# Patient Record
Sex: FEMALE | Race: BLACK OR AFRICAN AMERICAN | NOT HISPANIC OR LATINO | Employment: FULL TIME | URBAN - METROPOLITAN AREA
[De-identification: names, ages, dates, MRNs, and addresses within clinical notes are randomized per-mention and may not be internally consistent; named-entity substitution may affect disease eponyms.]

---

## 2018-01-26 PROCEDURE — 87624 HPV HI-RISK TYP POOLED RSLT: CPT | Performed by: FAMILY MEDICINE

## 2018-01-26 PROCEDURE — G0145 SCR C/V CYTO,THINLAYER,RESCR: HCPCS | Performed by: FAMILY MEDICINE

## 2018-01-29 ENCOUNTER — LAB REQUISITION (OUTPATIENT)
Dept: LAB | Facility: HOSPITAL | Age: 58
End: 2018-01-29
Payer: COMMERCIAL

## 2018-01-29 DIAGNOSIS — Z11.51 ENCOUNTER FOR SCREENING FOR HUMAN PAPILLOMAVIRUS (HPV): ICD-10-CM

## 2018-02-02 LAB
HPV RRNA GENITAL QL NAA+PROBE: NORMAL
LAB AP GYN PRIMARY INTERPRETATION: NORMAL
Lab: NORMAL

## 2018-05-11 ENCOUNTER — TRANSCRIBE ORDERS (OUTPATIENT)
Dept: ADMINISTRATIVE | Facility: HOSPITAL | Age: 58
End: 2018-05-11

## 2018-05-11 DIAGNOSIS — E05.80 HYPERTHYROIDISM DUE TO MOLAR THYROTROPIN: Primary | ICD-10-CM

## 2018-05-11 DIAGNOSIS — Z87.59 HYPERTHYROIDISM DUE TO MOLAR THYROTROPIN: Primary | ICD-10-CM

## 2018-05-18 LAB
ABSOL LYMPHOCYTES (HISTORICAL): 2.2 K/UL (ref 0.5–4)
ALBUMIN SERPL BCP-MCNC: 4.3 G/DL (ref 3–5.2)
ALP SERPL-CCNC: 96 U/L (ref 43–122)
ALT SERPL W P-5'-P-CCNC: 36 U/L (ref 9–52)
AST SERPL W P-5'-P-CCNC: 36 U/L (ref 14–36)
BILIRUB SERPL-MCNC: 0.8 MG/DL
BILIRUBIN DIRECT (HISTORICAL): 0.1 MG/DL
COMMENT (HISTORICAL): ABNORMAL
DEPRECATED RDW RBC AUTO: 13.8 %
EOSINOPHIL # BLD AUTO: 0.1 K/UL (ref 0–0.4)
EOSINOPHIL NFR BLD AUTO: 1 % (ref 0–6)
HCT VFR BLD AUTO: 41.3 % (ref 36–46)
HGB BLD-MCNC: 13.3 G/DL (ref 12–16)
LYMPHOCYTES NFR BLD AUTO: 40 % (ref 25–45)
MCH RBC QN AUTO: 25.4 PG (ref 26–34)
MCHC RBC AUTO-ENTMCNC: 32.3 % (ref 31–36)
MCV RBC AUTO: 79 FL (ref 80–100)
MONOCYTES # BLD AUTO: 0.5 K/UL (ref 0.2–0.9)
MONOCYTES NFR BLD AUTO: 10 % (ref 1–10)
NEUTROPHILS ABS COUNT (HISTORICAL): 2.6 K/UL (ref 1.8–7.8)
NEUTS SEG NFR BLD AUTO: 49 % (ref 45–65)
PLATELET # BLD AUTO: 219 K/MCL (ref 150–450)
RBC # BLD AUTO: 5.25 M/MCL (ref 4–5.2)
RBC MORPHOLOGY (HISTORICAL): ABNORMAL
T3 SERPL-MCNC: 3.04 NG/ML (ref 0.97–1.69)
T4 FREE SERPL-MCNC: 2.54 NG/DL (ref 0.78–2.19)
TOTAL PROTEIN (HISTORICAL): 7.4 G/DL (ref 5.9–8.4)
TSH SERPL DL<=0.05 MIU/L-ACNC: <0.015 UIU/ML (ref 0.47–4.68)
WBC # BLD AUTO: 5.4 K/MCL (ref 4.5–11)

## 2018-05-23 LAB — THYROID STIMULATING IMMUNOGLOBULIN (HISTORICAL): 135

## 2018-06-14 LAB
T3 SERPL-MCNC: 2.25 NG/ML (ref 0.97–1.69)
T4 FREE SERPL-MCNC: 1.23 NG/DL (ref 0.78–2.19)
TSH SERPL DL<=0.05 MIU/L-ACNC: <0.015 UIU/ML (ref 0.47–4.68)

## 2018-06-29 ENCOUNTER — TRANSCRIBE ORDERS (OUTPATIENT)
Dept: ADMINISTRATIVE | Facility: HOSPITAL | Age: 58
End: 2018-06-29

## 2018-06-29 ENCOUNTER — OFFICE VISIT (OUTPATIENT)
Dept: ENDOCRINOLOGY | Facility: CLINIC | Age: 58
End: 2018-06-29
Payer: COMMERCIAL

## 2018-06-29 ENCOUNTER — APPOINTMENT (OUTPATIENT)
Dept: LAB | Facility: HOSPITAL | Age: 58
End: 2018-06-29
Payer: COMMERCIAL

## 2018-06-29 VITALS
TEMPERATURE: 98.2 F | HEART RATE: 100 BPM | DIASTOLIC BLOOD PRESSURE: 70 MMHG | WEIGHT: 134 LBS | SYSTOLIC BLOOD PRESSURE: 132 MMHG | RESPIRATION RATE: 16 BRPM

## 2018-06-29 DIAGNOSIS — E05.00 GRAVES DISEASE: ICD-10-CM

## 2018-06-29 DIAGNOSIS — E05.00 GRAVES DISEASE: Primary | ICD-10-CM

## 2018-06-29 LAB
T3 SERPL-MCNC: 1.9 NG/ML (ref 0.6–1.8)
T4 FREE SERPL-MCNC: 1.16 NG/DL (ref 0.76–1.46)
TSH SERPL DL<=0.05 MIU/L-ACNC: <0.015 UIU/ML (ref 0.47–4.68)

## 2018-06-29 PROCEDURE — 84439 ASSAY OF FREE THYROXINE: CPT

## 2018-06-29 PROCEDURE — 84443 ASSAY THYROID STIM HORMONE: CPT

## 2018-06-29 PROCEDURE — 99213 OFFICE O/P EST LOW 20 MIN: CPT | Performed by: INTERNAL MEDICINE

## 2018-06-29 PROCEDURE — 84480 ASSAY TRIIODOTHYRONINE (T3): CPT

## 2018-06-29 PROCEDURE — 36415 COLL VENOUS BLD VENIPUNCTURE: CPT

## 2018-06-29 RX ORDER — METHIMAZOLE 5 MG/1
TABLET ORAL
COMMUNITY
Start: 2018-06-01 | End: 2018-06-29 | Stop reason: SDUPTHER

## 2018-06-29 RX ORDER — OLOPATADINE HYDROCHLORIDE 1 MG/ML
1 SOLUTION/ DROPS OPHTHALMIC
COMMUNITY
Start: 2018-04-20 | End: 2021-10-01 | Stop reason: SDUPTHER

## 2018-06-29 RX ORDER — KETOTIFEN FUMARATE 0.35 MG/ML
SOLUTION/ DROPS OPHTHALMIC
COMMUNITY
Start: 2018-06-11

## 2018-06-29 RX ORDER — POLYVINYL ALCOHOL 14 MG/ML
SOLUTION/ DROPS OPHTHALMIC
COMMUNITY
Start: 2018-05-14

## 2018-06-29 RX ORDER — 1.1% SODIUM FLUORIDE 11 MG/G
GEL DENTAL
COMMUNITY
Start: 2018-05-25 | End: 2020-09-18

## 2018-06-29 RX ORDER — METHIMAZOLE 5 MG/1
5 TABLET ORAL DAILY
Qty: 30 TABLET | Refills: 1 | Status: SHIPPED | OUTPATIENT
Start: 2018-06-29 | End: 2018-11-16 | Stop reason: SDUPTHER

## 2018-06-29 NOTE — PROGRESS NOTES
Geovanny Mota is a 65 y o , female  Presenting for follow up of hyperthyroidism  HYPERTHYROIDISM:   Geovanny Mota first presented for consultation in 5 2018 for thyrotoxicosis discovered on routine labs  She had high TSI levels  Baseline LFTs and ANC were normal  I prescribed MMI 5mg daily  Today she states she feels well  Patient denies symptoms of hyperthyroidism, including recent unintentional weight loss, fatigue, heat intolerance, hyperdefecation, tremor, or palpitations  There is no history of goiter  Patient denies difficulty swallowing, SOB, or hoarseness  Denies eye swelling/bulging/redness/dry eyes/double vision   Denies recent URI/neck pain   Not taking any of the following medications   Biotin  Amiodarone   Lithium   Interferon   Cytokine inhibitor   Immune checkpoint inhibitor   Denies recent exposure to iodinated contrast dye   MVI with iodine   Patient has no known h/o thyroid disease in the past  No family h/o thyroid disease  No h/o neck irradiation or surgery  No family h/o thyroid cancer  Not taking any other medications that could impact thyroid test results or thyroid function  She is postmenopausal     No known cardiac disease/ liver disease or renal disease     METABOLIC BONE HEALTH:     Menopause was at age 49 years     Exercise: walking daily- 30 mins average  No recent falls   No fractures   Family history of osteoporosis: no   Parental history of hip fracture: no  No kidney stones       Allergies: No active allergies       Current Outpatient Prescriptions:     olopatadine (PATANOL) 0 1 % ophthalmic solution, Apply 1 drop to eye, Disp: , Rfl:     ARTIFICIAL TEARS 1 4 % ophthalmic solution, , Disp: , Rfl:     ketotifen (ZADITOR) 0 025 % ophthalmic solution, , Disp: , Rfl:     methimazole (TAPAZOLE) 5 mg tablet, Take 1 tablet (5 mg total) by mouth daily, Disp: 30 tablet, Rfl: 1    SF 1 1 % GEL, , Disp: , Rfl:      Active Ambulatory Problems     Diagnosis Date Noted    Graves disease 06/29/2018     Resolved Ambulatory Problems     Diagnosis Date Noted    No Resolved Ambulatory Problems     No Additional Past Medical History         Social history     reports that she has never smoked  She has never used smokeless tobacco  She reports that she does not drink alcohol or use drugs  family history is not on file  No family history on file  There is no additional family hx of hyperthyroidism, hypothyroidism or goiter  Additionally, there is no family hx of autoimmune disease       Review of systems  Constitutional- denies wt loss/gain, fatigue, fever  Eyes:denies redness/swelling/itching/visual loss/diplopia  Ears:denies ear pain/hearing difficulty  Neck: denies neck swelling/pain/stridor  RS: denies wheezing/chest pain/cough/difficulty breathing  CVS: denies palpitations/chest pain/leg swelling/orthopnea/syncope  GI: denies abdominal pain/changes in bowel movements/changes in appetite/refux/nausea  : denies changes in urine color/flow/nocturia  Musculoskeletal: denies difficulty walking/ leg pain/back pain/lfocal weakness  Neuro: denies numbness/tingling/dysesthesias/tremors  Skin/hair: denies rash/dry skin/hair loss        LABS  Component      Latest Ref Rng & Units 5/18/2018 6/14/2018   Free T4      0 78 - 2 19 ng/dL 2 54 (H) 1 23   T3, Total      0 97 - 1 69 ng/mL 3 04 (H) 2 25 (H)   TSH 3RD GENERATON      0 465 - 4 680 uIU/mL <0 015 (L) <0 015 (L)   THYROID STIMULATING IMMUNOGLOBULIN       135 (H)      I have reviewed prior  labs which reveal the following  12 2017 TSH <0 015 FT4 elevated 3 0      Physical Exam:   Vitals:    06/29/18 1040   BP: 132/70   Pulse: 100   Resp: 16   Temp: 98 2 °F (36 8 °C)   Manual pulse 10 mins later 88    Face: not round or red   Eyes: no lid lag, retraction, or periorbital edema; full extra ocular movements; no chemosis or proptosis   Nose: not enlarged   Mouth: tongue not enlarged   Neck: Thyroid- not enlarged, smooth,nontender; no thrill/bruit   non-tender submandibular and parotid salivary glands; no supraclavicular fat pads   Lymphatic: normal anterior, posterior and supraclavicular cervical lymph nodes   Respiratory: symmetrical chest expansion; normal respiratory effort; clear to auscultation bilaterally   Breast/Chest: normal breast symmetry   Cardiovascular:normal rate/rhythm, S1 S2 normal, no murmurs no JVD, good carotid pulse   Musculoskeletal: no cyanosis, clubbing or edema in upper and lower extremities; normal gait; spine non-tender to percussion  Skin: normal temperature/texture, no ecchymoses; normal pigmentation, nails normal   Neurological: proximal muscles power 5/5, no Chvostek's sign bilaterally; no tremors, 2+ reflexes  Psychological: alert/oriented to place, time and person; normal affect; memory intact  Assessment / Plan:   Thyroid Plan:   Thyrotoxicosis due to  Graves disease   I have reviewed the diagnosis, expected course of disease and complications in detail  Also reviewed extra-systemic manifestations of Graves' including orbitopathy and advised to be watchful and avoid smoke (known risk factor)  I reviewed various options for treatment including thionamide, surgery, or I-131 therapy  Pt prefers ATD at this time, as she cannot get time off work for [employer], and is not inetrested in surgery at this time    Check TFTs today and adjust MMI dose  HR normal, no indication for beta blocker therapy  Written instructions provided regarding adverse effects of MMI including agranulocytosis and liver toxicity  Educated about warning symptoms of the same and advised to stop medication, call our office immediately and get lab tests if they occur  Metabolic Bone:   I have answered all of my patient's questions to the best of my ability  RTC in 2 months  I have answered my patient's questions to the best of my ability and have encouraged her/him to call  additional questions

## 2018-07-02 ENCOUNTER — TELEPHONE (OUTPATIENT)
Dept: ENDOCRINOLOGY | Facility: CLINIC | Age: 58
End: 2018-07-02

## 2018-08-21 ENCOUNTER — TELEPHONE (OUTPATIENT)
Dept: ENDOCRINOLOGY | Facility: CLINIC | Age: 58
End: 2018-08-21

## 2018-08-24 ENCOUNTER — OFFICE VISIT (OUTPATIENT)
Dept: ENDOCRINOLOGY | Facility: CLINIC | Age: 58
End: 2018-08-24
Payer: COMMERCIAL

## 2018-08-24 ENCOUNTER — TRANSCRIBE ORDERS (OUTPATIENT)
Dept: ADMINISTRATIVE | Facility: HOSPITAL | Age: 58
End: 2018-08-24

## 2018-08-24 ENCOUNTER — APPOINTMENT (OUTPATIENT)
Dept: LAB | Facility: HOSPITAL | Age: 58
End: 2018-08-24
Payer: COMMERCIAL

## 2018-08-24 VITALS
WEIGHT: 134 LBS | TEMPERATURE: 97.6 F | HEART RATE: 82 BPM | DIASTOLIC BLOOD PRESSURE: 82 MMHG | SYSTOLIC BLOOD PRESSURE: 126 MMHG | RESPIRATION RATE: 16 BRPM | HEIGHT: 61 IN | BODY MASS INDEX: 25.3 KG/M2

## 2018-08-24 DIAGNOSIS — E05.00 GRAVES DISEASE: Primary | ICD-10-CM

## 2018-08-24 DIAGNOSIS — E05.00 GRAVES DISEASE: ICD-10-CM

## 2018-08-24 PROBLEM — I10 ESSENTIAL HYPERTENSION: Status: ACTIVE | Noted: 2018-01-26

## 2018-08-24 PROBLEM — D64.9 ANEMIA: Status: ACTIVE | Noted: 2018-01-26

## 2018-08-24 LAB
T3 SERPL-MCNC: 1.1 NG/ML (ref 0.6–1.8)
T4 FREE SERPL-MCNC: 0.67 NG/DL (ref 0.76–1.46)
TSH SERPL DL<=0.05 MIU/L-ACNC: <0.015 UIU/ML (ref 0.47–4.68)

## 2018-08-24 PROCEDURE — 99213 OFFICE O/P EST LOW 20 MIN: CPT | Performed by: INTERNAL MEDICINE

## 2018-08-24 PROCEDURE — 36415 COLL VENOUS BLD VENIPUNCTURE: CPT

## 2018-08-24 PROCEDURE — 84480 ASSAY TRIIODOTHYRONINE (T3): CPT

## 2018-08-24 PROCEDURE — 84443 ASSAY THYROID STIM HORMONE: CPT

## 2018-08-24 PROCEDURE — 84439 ASSAY OF FREE THYROXINE: CPT

## 2018-08-24 RX ORDER — CHOLECALCIFEROL (VITAMIN D3) 50 MCG
CAPSULE ORAL
Refills: 3 | COMMUNITY
Start: 2018-07-17

## 2018-08-24 NOTE — PROGRESS NOTES
Bhanuintello Andrew Mcmahon 65 y o  female presents for follow up today  Last seen in     Interim history     No new medical problems or hospitalizations in the interim   weight has remained stable  Current medications-MMI 5mg daily  No episodes of fever/sore throat/ abdominal pain/jaundice/nausea/vomiting  Denies other symptoms of hypothyroidism or hyperthyroidism including   recent unintentional weight loss, fatigue, heat intolerance, hyperdefecation, tremor, or palpitations  There is no history of goiter  Patient denies difficulty swallowing, SOB, or hoarseness  Denies eye swelling/bulging/redness/dry eyes/double vision   Denies recent URI/neck pain   Not taking any of the following medications   Biotin  Amiodarone   Lithium   Interferon   Cytokine inhibitor   Immune checkpoint inhibitor   Denies recent exposure to iodinated contrast dye   MVI with iodine   Family hx of thyroid disease- no h/o thyrotoxicosis, or other autoimmune diseases  HYPERTHYROIDISM:   Katie Root first presented for consultation in 5 2018 for thyrotoxicosis discovered on routine labs  She had high TSI levels  Baseline LFTs and ANC were normal  I prescribed MMI 5mg daily  Patient has no known h/o thyroid disease in the past  No family h/o thyroid disease  No h/o neck irradiation or surgery  No family h/o thyroid cancer  Not taking any other medications that could impact thyroid test results or thyroid function  She is postmenopausal      No known cardiac disease/ liver disease or renal disease      METABOLIC BONE HEALTH:      Menopause was at age 49 years     Exercise: walking daily- 30 mins average  No recent falls   No fractures   Family history of osteoporosis: no   Parental history of hip fracture: no  No kidney stones        Review of systems  Constitutional- denies fatigue, fever  Eyes:denies redness/swelling/itching/visual loss/diplopia  Ears:denies ear pain/hearing difficulty  Neck: denies neck swelling/pain/stridor  RS: denies wheezing/chest pain/cough/difficulty breathing  CVS: denies palpitations/chest pain/leg swelling/orthopnea/syncope  GI: denies abdominal pain/changes in bowel movements/changes in appetite/refux/nausea  : denies changes in urine color/flow/nocturia  Musculoskeletal: denies difficulty walking/ leg pain/back pain/lfocal weakness  Neuro: denies numbness/tingling/dysesthesias/tremors  Skin/hair: denies rash/dry skin/hair loss       Current Outpatient Prescriptions:     ARTIFICIAL TEARS 1 4 % ophthalmic solution, , Disp: , Rfl:     CVS ALLERGY RELIEF 10 MG tablet, TAKE 1 TABLET BY MOUTH DAILY, Disp: , Rfl: 3    ketotifen (ZADITOR) 0 025 % ophthalmic solution, , Disp: , Rfl:     methimazole (TAPAZOLE) 5 mg tablet, Take 1 tablet (5 mg total) by mouth daily, Disp: 30 tablet, Rfl: 1    olopatadine (PATANOL) 0 1 % ophthalmic solution, Apply 1 drop to eye, Disp: , Rfl:     SF 1 1 % GEL, , Disp: , Rfl:     Allergies   Allergen Reactions    No Active Allergies        Patient Active Problem List   Diagnosis    Graves disease       History reviewed  No pertinent family history  Social History     Social History    Marital status: /Civil Union     Spouse name: N/A    Number of children: N/A    Years of education: N/A     Occupational History    Not on file  Social History Main Topics    Smoking status: Never Smoker    Smokeless tobacco: Never Used    Alcohol use No    Drug use: No    Sexual activity: Not on file     Other Topics Concern    Not on file     Social History Narrative    No caffeine use       PHYSICAL EXAM    Vitals:    08/24/18 0931   BP: 126/82   BP Location: Left arm   Patient Position: Sitting   Cuff Size: Adult   Pulse: 82   Resp: 16   Temp: 97 6 °F (36 4 °C)   Weight: 60 8 kg (134 lb)   Height: 5' 1" (1 549 m)   Body mass index is 25 32 kg/m²    General: AAOx3, NAD  Eyes; no conjunctival redness, cornea clear, no proptosis, pupils round, reactive to ight  Ears: normal external ears  Mouth/Throat: buccal mucosa moist, no patches, poor dental hygiene uvula midline  Neck: no abnormal cervical LN  Thyroid: no visible goiter but mildly enlarged on palpation, nontender, smooth surface, no palpable nodules, no thrill/bruit  RS: chest clear to auscultation, no added sounds  CVS: no JVD, RRR, no murmurs  Abdomen:  no palpable masses/free fluid, percussion tympanitic, normal BS  Extremities: no pedal edema, no cyanosis/clubbing  Neuro: no gross focal neurologic deficits, no tremors  Dermatologic: no skin rash/no wide purple striae, no ecchymoses, no focal hair loss, skin moist to touch  Psych: appropriate mood and affect    LABS  I have personally reviewed labs below which show improving TFTs in 6 2018  Component      Latest Ref Rng & Units 5/18/2018 6/14/2018 6/29/2018   Free T4      0 76 - 1 46 ng/dL 2 54 (H) 1 23 1 16   T3, Total      0 60 - 1 80 ng/mL 3 04 (H) 2 25 (H) 1 90 (H)   TSH 3RD GENERATON      0 465 - 4 680 uIU/mL <0 015 (L) <0 015 (L)    THYROID STIMULATING IMMUNOGLOBULIN       135 (H)     TSH Baseline      0 465 - 4 680 uIU/mL   <0 015 (L)   Baseline CBC and LFTs were normal in 5 2018  TSI elevated    ASSESSMENT AND PLAN    Thyrotoxicosis due to  Graves disease   Clinically improving, asymptomatic currrently  I have reviewed the diagnosis, expected course of disease and complications in detail  Also reviewed extra-systemic manifestations of Graves' including orbitopathy and advised to be watchful and avoid smoke (known risk factor)  I reviewed various options for treatment including thionamide, surgery, or I-131 therapy  Pt still prefers ATD at this time, as she cannot get time off work for [employer], and is not interested in surgery at this time     Check TFTs today and adjust MMI dose  HR normal, no indication for beta blocker therapy       Written instructions provided regarding adverse effects of MMI including agranulocytosis and liver toxicity   Educated about warning symptoms of the same and advised to stop medication, call our office immediately and get lab tests if they occur       Metabolic Bone:   I have answered all of my patient's questions to the best of my ability  RTC in 2 months  I have answered my patient's questions to the best of my ability and have encouraged her/him to call  additional questions              No orders of the defined types were placed in this encounter        RTC in

## 2018-08-27 ENCOUNTER — TELEPHONE (OUTPATIENT)
Dept: ENDOCRINOLOGY | Facility: CLINIC | Age: 58
End: 2018-08-27

## 2018-09-04 ENCOUNTER — TELEPHONE (OUTPATIENT)
Dept: ENDOCRINOLOGY | Facility: CLINIC | Age: 58
End: 2018-09-04

## 2018-09-04 NOTE — TELEPHONE ENCOUNTER
Pt called requested refill methimazole, she is out of med   Called to [facility] pharmacy as requested

## 2018-11-16 ENCOUNTER — OFFICE VISIT (OUTPATIENT)
Dept: FAMILY MEDICINE CLINIC | Facility: CLINIC | Age: 58
End: 2018-11-16
Payer: COMMERCIAL

## 2018-11-16 VITALS
SYSTOLIC BLOOD PRESSURE: 144 MMHG | RESPIRATION RATE: 16 BRPM | BODY MASS INDEX: 26.64 KG/M2 | OXYGEN SATURATION: 98 % | HEART RATE: 99 BPM | DIASTOLIC BLOOD PRESSURE: 90 MMHG | WEIGHT: 141 LBS | TEMPERATURE: 97.6 F

## 2018-11-16 DIAGNOSIS — I10 ESSENTIAL HYPERTENSION: ICD-10-CM

## 2018-11-16 DIAGNOSIS — E05.00 GRAVES DISEASE: ICD-10-CM

## 2018-11-16 DIAGNOSIS — Z23 NEED FOR VACCINATION: Primary | ICD-10-CM

## 2018-11-16 PROCEDURE — 99213 OFFICE O/P EST LOW 20 MIN: CPT | Performed by: FAMILY MEDICINE

## 2018-11-16 PROCEDURE — 3725F SCREEN DEPRESSION PERFORMED: CPT | Performed by: FAMILY MEDICINE

## 2018-11-16 PROCEDURE — 1036F TOBACCO NON-USER: CPT | Performed by: FAMILY MEDICINE

## 2018-11-16 PROCEDURE — 90682 RIV4 VACC RECOMBINANT DNA IM: CPT | Performed by: FAMILY MEDICINE

## 2018-11-16 PROCEDURE — 90471 IMMUNIZATION ADMIN: CPT | Performed by: FAMILY MEDICINE

## 2018-11-16 RX ORDER — METHIMAZOLE 5 MG/1
5 TABLET ORAL DAILY
Qty: 30 TABLET | Refills: 0 | Status: SHIPPED | OUTPATIENT
Start: 2018-11-16 | End: 2019-01-11 | Stop reason: SDUPTHER

## 2018-11-16 RX ORDER — ATENOLOL 50 MG/1
50 TABLET ORAL DAILY
Qty: 30 TABLET | Refills: 3 | Status: SHIPPED | OUTPATIENT
Start: 2018-11-16 | End: 2019-07-05 | Stop reason: SDUPTHER

## 2018-11-16 NOTE — ASSESSMENT & PLAN NOTE
Symptomatic Graves  Blood Pressure: 144/90 , pulse 99  Will start BB  Patient was previously seen by Dr Rachel Ayala and for her Graves disease however Dr Tomasa Montez is no longer in practice and patient needs a referral to Endocrinology  Pulses elevated blood pressures elevated will start beta-blocker  Will refill methimazole 5 mg tablet until seen by Endocrinology  Reassess next visit

## 2018-11-16 NOTE — PATIENT INSTRUCTIONS
Graves Disease   WHAT YOU NEED TO KNOW:   Graves disease is an autoimmune disease that causes your immune system to attack your thyroid gland  This causes your body to make too much thyroid hormone and leads to hyperthyroidism  The thyroid gland is a butterfly-shaped organ that is found in the front part of your neck  Thyroid hormones regulate body temperature, heart rate, and weight  DISCHARGE INSTRUCTIONS:   Call 911 or have someone call 911 for any of the following:   · You have a seizure  · You feel like you are going to faint  · You have sudden chest pain or shortness of breath  Return to the emergency department if:   · You have a fever  · You have trouble thinking clearly  · You are shaking or sweating  · Your heart is beating faster than usual, or you have an irregular heartbeat  Contact your healthcare provider if:   · You have nausea, vomiting, or diarrhea  · You feel nervous, restless, confused or agitated       · You run out of thyroid medicine, or you have stopped taking it  · You have questions or concerns about your condition or care  Medicines:   · Antithyroid medicines  decrease thyroid hormone levels and your symptoms  · Radioactive iodine  is given to damage or kill some thyroid gland cells  This may decrease the amount of thyroid hormone produced  Tell your healthcare provider if you are breastfeeding, or you know or think you are pregnant  This medicine can be harmful to your baby  · Heart medicine  may be given to control and regulate your heart rate  · Take your medicine as directed  Contact your healthcare provider if you think your medicine is not helping or if you have side effects  Tell him or her if you are allergic to any medicine  Keep a list of the medicines, vitamins, and herbs you take  Include the amounts, and when and why you take them  Bring the list or the pill bottles to follow-up visits   Carry your medicine list with you in case of an emergency  Manage Graves disease:   · Do not smoke or be around secondhand smoke  Cigarette smoke increases your risk of GO or can make it worse if you already have it  Nicotine and other chemicals in cigarettes and cigars can also cause lung damage  Ask your healthcare provider for information if you currently smoke and need help to quit  E-cigarettes or smokeless tobacco still contain nicotine  Talk to your healthcare provider before you use these products  · Sleep with your head elevated if you have eye symptoms  This may help to decrease swelling of your eyelids  Your healthcare provider may recommend that you sleep with your eyes taped shut  This can help to prevent dry eyes  · Sunglasses  may help decrease light sensitivity  · Take medicine as directed and go to follow-up appointments  Do not stop taking your medicine unless your healthcare provider has asked you to  This could increase your thyroid levels and lead to other health problems  You will need to go to regular follow-up appointments to have your thyroid levels checked  Follow up with your healthcare provider as directed: You may need to return for regular blood tests to check your thyroid hormone levels  This will help your healthcare provider decide if you are getting the right amount of medicine  Do not stop taking your medicines without talking to your healthcare provider first  Write down your questions so you remember to ask them during your visits  © 2017 [employer] Information is for End User's use only and may not be sold, redistributed or otherwise used for commercial purposes  All illustrations and images included in CareNotes® are the copyrighted property of [employer]  or Lamont Manning  The above information is an  only  It is not intended as medical advice for individual conditions or treatments   Talk to your doctor, nurse or pharmacist before following any medical regimen to see if it is safe and effective for you

## 2018-11-16 NOTE — PROGRESS NOTES
Assessment/Plan:    Graves disease  Symptomatic Graves  Blood Pressure: 144/90 , pulse 99  Will start BB  Patient was previously seen by Dr Arturo Box and for her Graves disease however Dr Valerie Ricardo is no longer in practice and patient needs a referral to Endocrinology  Pulses elevated blood pressures elevated will start beta-blocker  Will refill methimazole 5 mg tablet until seen by Endocrinology  Reassess next visit  Essential hypertension  Blood Pressure: 144/90   Elevated in office today  HR also elevated  Will start Atenolol 50mg daily  Explained side effects of BB to patient  Reassess in 1 months  Diagnoses and all orders for this visit:    Need for vaccination  -     influenza vaccine, [id number], quadrivalent, recombinant, PF, 0 5 mL, for patients 18 yr+ ([id number])    Graves disease  -     methimazole (TAPAZOLE) 5 mg tablet; Take 1 tablet (5 mg total) by mouth daily  -     atenolol (TENORMIN) 50 mg tablet; Take 1 tablet (50 mg total) by mouth daily  -     Ambulatory referral to Endocrinology; Future    Essential hypertension  -     atenolol (TENORMIN) 50 mg tablet; Take 1 tablet (50 mg total) by mouth daily          Subjective:      Patient ID: Jessica Camp is a 65 y o  female  He presents for follow up on his chronic conditions  Patient Active Problem List:     Graves disease, patient denies complaints at this time, states she needs a refill on her methimazole and a referral to a new endocrinologist      Essential hypertension, denies com plaints at this time, BP elevated in office  The following portions of the patient's history were reviewed and updated as appropriate: allergies, current medications, past family history, past medical history, past social history, past surgical history and problem list     Review of Systems   Constitutional: Negative for chills and fever  HENT: Negative for ear pain and sore throat  Eyes: Negative for pain and redness     Respiratory: Negative for cough and shortness of breath  Cardiovascular: Negative for chest pain, palpitations and leg swelling  Gastrointestinal: Negative for abdominal pain, diarrhea and nausea  Endocrine: Negative for cold intolerance, heat intolerance, polydipsia, polyphagia and polyuria  Genitourinary: Negative for dysuria and hematuria  Musculoskeletal: Negative for back pain and neck pain  Neurological: Negative for dizziness and headaches  Psychiatric/Behavioral: Negative for dysphoric mood  The patient is not nervous/anxious  Objective:      /90 (BP Location: Left arm, Patient Position: Sitting, Cuff Size: Standard)   Pulse 99   Temp 97 6 °F (36 4 °C)   Resp 16   Wt 64 kg (141 lb)   SpO2 98%   BMI 26 64 kg/m²          Physical Exam   Constitutional: She is oriented to person, place, and time  She appears well-developed and well-nourished  HENT:   Head: Normocephalic and atraumatic  Right Ear: External ear normal    Left Ear: External ear normal    Nose: Nose normal    Mouth/Throat: Oropharynx is clear and moist    Eyes: Pupils are equal, round, and reactive to light  Conjunctivae and EOM are normal    Neck: Normal range of motion  Neck supple  No JVD present  Cardiovascular: Regular rhythm, normal heart sounds and intact distal pulses  tachycardic   Pulmonary/Chest: Effort normal and breath sounds normal    Abdominal: Soft  Bowel sounds are normal  There is no tenderness  Musculoskeletal: Normal range of motion  She exhibits no edema or tenderness  Lymphadenopathy:     She has no cervical adenopathy  Neurological: She is alert and oriented to person, place, and time  Skin: Skin is warm and dry  Psychiatric: She has a normal mood and affect   Her behavior is normal

## 2018-11-16 NOTE — ASSESSMENT & PLAN NOTE
Blood Pressure: 144/90   Elevated in office today  HR also elevated  Will start Atenolol 50mg daily  Explained side effects of BB to patient  Reassess in 1 months

## 2018-12-03 DIAGNOSIS — E05.00 GRAVES DISEASE: ICD-10-CM

## 2018-12-03 RX ORDER — METHIMAZOLE 5 MG/1
TABLET ORAL
Qty: 30 TABLET | Refills: 0 | Status: CANCELLED | OUTPATIENT
Start: 2018-12-03

## 2019-01-11 ENCOUNTER — OFFICE VISIT (OUTPATIENT)
Dept: FAMILY MEDICINE CLINIC | Facility: CLINIC | Age: 59
End: 2019-01-11

## 2019-01-11 ENCOUNTER — APPOINTMENT (OUTPATIENT)
Dept: LAB | Facility: HOSPITAL | Age: 59
End: 2019-01-11
Payer: COMMERCIAL

## 2019-01-11 VITALS
OXYGEN SATURATION: 98 % | BODY MASS INDEX: 26.83 KG/M2 | HEART RATE: 78 BPM | TEMPERATURE: 96.6 F | SYSTOLIC BLOOD PRESSURE: 110 MMHG | RESPIRATION RATE: 16 BRPM | DIASTOLIC BLOOD PRESSURE: 72 MMHG | WEIGHT: 142 LBS

## 2019-01-11 DIAGNOSIS — I10 ESSENTIAL HYPERTENSION: Primary | ICD-10-CM

## 2019-01-11 DIAGNOSIS — E05.00 GRAVES DISEASE: ICD-10-CM

## 2019-01-11 LAB
T3FREE SERPL-MCNC: 3.53 PG/ML (ref 2.3–4.2)
T4 FREE SERPL-MCNC: 0.81 NG/DL (ref 0.76–1.46)
TSH SERPL DL<=0.05 MIU/L-ACNC: 0.58 UIU/ML (ref 0.47–4.68)

## 2019-01-11 PROCEDURE — 84443 ASSAY THYROID STIM HORMONE: CPT

## 2019-01-11 PROCEDURE — 86800 THYROGLOBULIN ANTIBODY: CPT

## 2019-01-11 PROCEDURE — 99213 OFFICE O/P EST LOW 20 MIN: CPT | Performed by: FAMILY MEDICINE

## 2019-01-11 PROCEDURE — 36415 COLL VENOUS BLD VENIPUNCTURE: CPT

## 2019-01-11 PROCEDURE — 86376 MICROSOMAL ANTIBODY EACH: CPT

## 2019-01-11 PROCEDURE — 93000 ELECTROCARDIOGRAM COMPLETE: CPT | Performed by: FAMILY MEDICINE

## 2019-01-11 PROCEDURE — 84439 ASSAY OF FREE THYROXINE: CPT

## 2019-01-11 PROCEDURE — 84481 FREE ASSAY (FT-3): CPT

## 2019-01-11 RX ORDER — METHIMAZOLE 5 MG/1
5 TABLET ORAL DAILY
Qty: 30 TABLET | Refills: 0 | Status: SHIPPED | OUTPATIENT
Start: 2019-01-11 | End: 2019-02-08 | Stop reason: SDUPTHER

## 2019-01-11 NOTE — PROGRESS NOTES
Assessment/Plan:    Graves disease  Patient currently c/o intermittent CP, denies palpitations  She is on Methimazole 5mg and Tenormin, however ran out of Methimazole last week  BP and HR WNL today  Patient states she cannot get an appointment with Endocrinology until July  Contacted Dr Rissa Youssef, who will resume care of patient  EKG NSR  Will get labs  Continue current management  RTC in one month  Essential hypertension  BP Readings from Last 1 Encounters:   01/11/19 110/72       Currently controlled  Continue current management  Reassess at next visit  Diagnoses and all orders for this visit:    Essential hypertension  -     POCT ECG    Graves disease  -     methimazole (TAPAZOLE) 5 mg tablet; Take 1 tablet (5 mg total) by mouth daily  -     TSH, 3rd generation; Future  -     T4, free; Future  -     Thyroid Antibodies Panel; Future  -     T3, free; Future  -     POCT ECG          Subjective:      Patient ID: Marco Wilson is a 63 y o  female  Thyroid: Patient presents for evaluation of hypothyroidism and Grave's disease  Current symptoms include palpitations, heat intolerance  Patient denies constipation, swelling, palpitations, diarrhea  The following portions of the patient's history were reviewed and updated as appropriate: allergies, current medications, past family history, past medical history, past social history, past surgical history and problem list     Review of Systems   Constitutional: Negative for chills and fever  HENT: Negative for ear pain and sore throat  Eyes: Negative for pain and redness  Respiratory: Negative for cough and shortness of breath  Cardiovascular: Positive for palpitations  Negative for chest pain and leg swelling  Gastrointestinal: Negative for abdominal pain, diarrhea and nausea  Genitourinary: Negative for dysuria and hematuria  Musculoskeletal: Negative for back pain and neck pain     Neurological: Negative for dizziness and headaches  Psychiatric/Behavioral: Negative for dysphoric mood  The patient is not nervous/anxious  Objective:      /72 (BP Location: Left arm, Patient Position: Sitting, Cuff Size: Adult)   Pulse 78   Temp (!) 96 6 °F (35 9 °C) (Temporal)   Resp 16   Wt 64 4 kg (142 lb)   SpO2 98%   BMI 26 83 kg/m²          Physical Exam   Constitutional: She is oriented to person, place, and time  She appears well-developed and well-nourished  HENT:   Head: Normocephalic and atraumatic  Right Ear: External ear normal    Left Ear: External ear normal    Nose: Nose normal    Mouth/Throat: Oropharynx is clear and moist    Eyes: Pupils are equal, round, and reactive to light  Conjunctivae and EOM are normal    Neck: Normal range of motion  Neck supple  No JVD present  Cardiovascular: Normal rate, regular rhythm, normal heart sounds and intact distal pulses  Pulmonary/Chest: Effort normal and breath sounds normal    Abdominal: Soft  Bowel sounds are normal  There is no tenderness  Musculoskeletal: Normal range of motion  She exhibits no edema or tenderness  Lymphadenopathy:     She has no cervical adenopathy  Neurological: She is alert and oriented to person, place, and time  Skin: Skin is warm and dry  Psychiatric: She has a normal mood and affect   Her behavior is normal

## 2019-01-11 NOTE — ASSESSMENT & PLAN NOTE
Patient currently c/o intermittent CP, denies palpitations  She is on Methimazole 5mg and Tenormin, however ran out of Methimazole last week  BP and HR WNL today  Patient states she cannot get an appointment with Endocrinology until July  Contacted Dr Chantel Lew, who will resume care of patient  EKG NSR  Will get labs  Continue current management  RTC in one month

## 2019-01-11 NOTE — ASSESSMENT & PLAN NOTE
BP Readings from Last 1 Encounters:   01/11/19 110/72       Currently controlled  Continue current management  Reassess at next visit

## 2019-01-11 NOTE — PATIENT INSTRUCTIONS
Graves Disease   AMBULATORY CARE:   Graves disease  is an autoimmune disease that causes your immune system to attack your thyroid gland  This causes your body to make too much thyroid hormone and leads to hyperthyroidism  The thyroid gland is a butterfly-shaped organ that is found in the front part of your neck  Thyroid hormones regulate body temperature, heart rate, and weight  Common signs and symptoms include the following:   · Nervousness, irritability, fatigue, or muscle weakness    · Trouble sleeping or increased sensitivity to heat    · Hand tremors or increased sweating    · An irregular or fast heartbeat    · Diarrhea or more frequent bowel movements than usual    · Losing weight without trying    · Children may have a decreased attention span that leads to a drop in school grades    · Symptoms of Graves ophthalmology (GO) such as protruding eyeballs, puffy eyelids, double vision, light sensitivity, or pain or pressure in your eyes    · Reddening or thickening of skin on the shins  Thyroid storm:  A thyroid storm happens when your thyroid hormone levels get too high  Your body temperature may go very high, your heart may beat very fast, and you may have problems thinking  You may have increased sweating, vomiting, or diarrhea  You may have seizures or go into a coma  Thyroid storm may happen if you have hyperthyroidism and get an infection or stop taking your thyroid medicine  Injuries, burns, and certain medicines can also cause a thyroid storm  Call 911 or have someone call 911 for any of the following:   · You have a seizure  · You feel like you are going to faint  · You have sudden chest pain or shortness of breath  Seek care immediately if:   · You have a fever  · You have trouble thinking clearly  · You are shaking or sweating  · Your heart is beating faster than usual, or you have an irregular heartbeat    Contact your healthcare provider if:   · You have nausea, vomiting, or diarrhea  · You feel nervous, restless, confused or agitated       · You run out of thyroid medicine, or you have stopped taking it  · You have questions or concerns about your condition or care  Treatment for Graves disease  may include any of the following:  · Antithyroid medicines  decrease thyroid hormone levels and your symptoms  · Radioactive iodine  is given to damage or kill some thyroid gland cells  This may decrease the amount of thyroid hormone produced  Tell your healthcare provider if you are breastfeeding, or you know or think you are pregnant  This medicine can be harmful to your baby  · Heart medicine  may be given to control and regulate your heart rate  · Treatment for eye problems  may also be needed  Eye drops can help to relieve dry or irritated eyes  Steroids may be given to decrease eye swelling and pain  Special lenses may be needed if you have double vision  Radiation may be applied to your eyes to decrease swelling if you have severe eye problems  · Surgery  may be done to remove all or part of the thyroid gland  Manage Graves disease:   · Do not smoke or be around secondhand smoke  Cigarette smoke increases your risk of GO or can make it worse if you already have it  Nicotine and other chemicals in cigarettes and cigars can also cause lung damage  Ask your healthcare provider for information if you currently smoke and need help to quit  E-cigarettes or smokeless tobacco still contain nicotine  Talk to your healthcare provider before you use these products  · Sleep with your head elevated if you have eye symptoms  This may help to decrease swelling of your eyelids  Your healthcare provider may recommend that you sleep with your eyes taped shut  This can help to prevent dry eyes  · Sunglasses  may help decrease light sensitivity  · Take medicine as directed and go to follow-up appointments    Do not stop taking your medicine unless your healthcare provider has asked you to  This could increase your thyroid levels and lead to other health problems  You will need to go to regular follow-up appointments to have your thyroid levels checked  Follow up with your healthcare provider as directed: You may need to return for regular blood tests to check your thyroid hormone levels  This will help your healthcare provider decide if you are getting the right amount of medicine  Do not stop taking your medicines without talking to your healthcare provider first  Write down your questions so you remember to ask them during your visits  © 2017 [employer] Information is for End User's use only and may not be sold, redistributed or otherwise used for commercial purposes  All illustrations and images included in CareNotes® are the copyrighted property of [employer]  or Lamont Manning  The above information is an  only  It is not intended as medical advice for individual conditions or treatments  Talk to your doctor, nurse or pharmacist before following any medical regimen to see if it is safe and effective for you

## 2019-01-12 LAB
THYROGLOB AB SERPL-ACNC: <1 IU/ML (ref 0–0.9)
THYROPEROXIDASE AB SERPL-ACNC: 12 IU/ML (ref 0–34)

## 2019-02-08 DIAGNOSIS — E05.00 GRAVES DISEASE: ICD-10-CM

## 2019-02-08 RX ORDER — METHIMAZOLE 5 MG/1
5 TABLET ORAL DAILY
Qty: 30 TABLET | Refills: 0 | Status: SHIPPED | OUTPATIENT
Start: 2019-02-08 | End: 2019-07-05 | Stop reason: SDUPTHER

## 2019-02-08 NOTE — TELEPHONE ENCOUNTER
Patient called into the front office checking in on status of refills  I did inform pt refills may take 24-48 hrs pt understands and is aware

## 2019-02-19 ENCOUNTER — HOSPITAL ENCOUNTER (OUTPATIENT)
Dept: RADIOLOGY | Facility: HOSPITAL | Age: 59
Discharge: HOME | End: 2019-02-19
Attending: RADIOLOGY

## 2019-02-19 DIAGNOSIS — Z71.2 PERSON CONSULTING FOR EXPLANATION OF EXAMINATION OR TEST FINDING: ICD-10-CM

## 2019-02-27 ENCOUNTER — OFFICE VISIT (OUTPATIENT)
Dept: FAMILY MEDICINE CLINIC | Facility: CLINIC | Age: 59
End: 2019-02-27

## 2019-02-27 VITALS
HEART RATE: 71 BPM | SYSTOLIC BLOOD PRESSURE: 140 MMHG | OXYGEN SATURATION: 97 % | WEIGHT: 145 LBS | RESPIRATION RATE: 18 BRPM | TEMPERATURE: 96.8 F | BODY MASS INDEX: 27.38 KG/M2 | DIASTOLIC BLOOD PRESSURE: 84 MMHG | HEIGHT: 61 IN

## 2019-02-27 DIAGNOSIS — I10 ESSENTIAL HYPERTENSION: ICD-10-CM

## 2019-02-27 DIAGNOSIS — E05.00 GRAVES DISEASE: Primary | ICD-10-CM

## 2019-02-27 PROCEDURE — 99213 OFFICE O/P EST LOW 20 MIN: CPT | Performed by: FAMILY MEDICINE

## 2019-02-27 NOTE — PROGRESS NOTES
Assessment/Plan:    Graves disease  Currently being followed by Dr Bonifacio Strange has ordered blood work  On methimazole 5mg daily  On Atenolol 50mg daily  Essential hypertension  BP Readings from Last 1 Encounters:   02/27/19 140/84       Currently controlled  Continue current management  Reassess at next visit  Diagnoses and all orders for this visit:    Graves disease    Essential hypertension          Subjective:      Patient ID: Rl Alston is a 63 y o  female  Who presents for follow up on their chronic conditions  No complaints  Patient Active Problem List:     Graves disease, controlled, followed by Endocrinology Dr Bonifacio Strange, on methimazole 5mg and Atenolol 50mg daily, denies chest pain, palpitations, neck pain, dry skin, diaphore     Essential hypertension, well controlled, adherent to Atenolol 50mg daily  The following portions of the patient's history were reviewed and updated as appropriate: allergies, current medications, past family history, past medical history, past social history, past surgical history and problem list     Review of Systems   Constitutional: Negative for chills and fever  HENT: Negative for ear pain and sore throat  Eyes: Negative for pain and redness  Respiratory: Negative for cough and shortness of breath  Cardiovascular: Negative for chest pain, palpitations and leg swelling  Gastrointestinal: Negative for abdominal pain, diarrhea and nausea  Genitourinary: Negative for dysuria and hematuria  Musculoskeletal: Negative for back pain and neck pain  Neurological: Negative for dizziness and headaches  Psychiatric/Behavioral: Negative for dysphoric mood  The patient is not nervous/anxious            Objective:      /84 (BP Location: Left arm, Patient Position: Sitting, Cuff Size: Adult)   Pulse 71   Temp (!) 96 8 °F (36 °C) (Temporal)   Resp 18   Ht 5' 1" (1 549 m)   Wt 65 8 kg (145 lb)   SpO2 97%   BMI 27 40 kg/m² Physical Exam   Constitutional: She is oriented to person, place, and time  She appears well-developed and well-nourished  HENT:   Head: Normocephalic and atraumatic  Right Ear: External ear normal    Left Ear: External ear normal    Nose: Nose normal    Mouth/Throat: Oropharynx is clear and moist    Eyes: Pupils are equal, round, and reactive to light  Conjunctivae and EOM are normal    Neck: Normal range of motion  Neck supple  No JVD present  Thyromegaly present  Cardiovascular: Normal rate, regular rhythm, normal heart sounds and intact distal pulses  Pulmonary/Chest: Effort normal and breath sounds normal    Abdominal: Soft  Bowel sounds are normal  There is no tenderness  Musculoskeletal: Normal range of motion  She exhibits no edema or tenderness  Lymphadenopathy:     She has no cervical adenopathy  Neurological: She is alert and oriented to person, place, and time  Skin: Skin is warm and dry  Psychiatric: She has a normal mood and affect   Her behavior is normal

## 2019-02-27 NOTE — ASSESSMENT & PLAN NOTE
BP Readings from Last 1 Encounters:   02/27/19 140/84       Currently controlled  Continue current management  Reassess at next visit

## 2019-02-27 NOTE — PATIENT INSTRUCTIONS
Graves Disease   WHAT YOU NEED TO KNOW:   Graves disease is an autoimmune disease that causes your immune system to attack your thyroid gland  This causes your body to make too much thyroid hormone and leads to hyperthyroidism  The thyroid gland is a butterfly-shaped organ that is found in the front part of your neck  Thyroid hormones regulate body temperature, heart rate, and weight  DISCHARGE INSTRUCTIONS:   Call 911 or have someone call 911 for any of the following:   · You have a seizure  · You feel like you are going to faint  · You have sudden chest pain or shortness of breath  Return to the emergency department if:   · You have a fever  · You have trouble thinking clearly  · You are shaking or sweating  · Your heart is beating faster than usual, or you have an irregular heartbeat  Contact your healthcare provider if:   · You have nausea, vomiting, or diarrhea  · You feel nervous, restless, confused or agitated       · You run out of thyroid medicine, or you have stopped taking it  · You have questions or concerns about your condition or care  Medicines:   · Antithyroid medicines  decrease thyroid hormone levels and your symptoms  · Radioactive iodine  is given to damage or kill some thyroid gland cells  This may decrease the amount of thyroid hormone produced  Tell your healthcare provider if you are breastfeeding, or you know or think you are pregnant  This medicine can be harmful to your baby  · Heart medicine  may be given to control and regulate your heart rate  · Take your medicine as directed  Contact your healthcare provider if you think your medicine is not helping or if you have side effects  Tell him or her if you are allergic to any medicine  Keep a list of the medicines, vitamins, and herbs you take  Include the amounts, and when and why you take them  Bring the list or the pill bottles to follow-up visits   Carry your medicine list with you in case of an emergency  Manage Graves disease:   · Do not smoke or be around secondhand smoke  Cigarette smoke increases your risk of GO or can make it worse if you already have it  Nicotine and other chemicals in cigarettes and cigars can also cause lung damage  Ask your healthcare provider for information if you currently smoke and need help to quit  E-cigarettes or smokeless tobacco still contain nicotine  Talk to your healthcare provider before you use these products  · Sleep with your head elevated if you have eye symptoms  This may help to decrease swelling of your eyelids  Your healthcare provider may recommend that you sleep with your eyes taped shut  This can help to prevent dry eyes  · Sunglasses  may help decrease light sensitivity  · Take medicine as directed and go to follow-up appointments  Do not stop taking your medicine unless your healthcare provider has asked you to  This could increase your thyroid levels and lead to other health problems  You will need to go to regular follow-up appointments to have your thyroid levels checked  Follow up with your healthcare provider as directed: You may need to return for regular blood tests to check your thyroid hormone levels  This will help your healthcare provider decide if you are getting the right amount of medicine  Do not stop taking your medicines without talking to your healthcare provider first  Write down your questions so you remember to ask them during your visits  © 2017 [employer] Information is for End User's use only and may not be sold, redistributed or otherwise used for commercial purposes  All illustrations and images included in CareNotes® are the copyrighted property of [employer]  or Lamont Manning  The above information is an  only  It is not intended as medical advice for individual conditions or treatments   Talk to your doctor, nurse or pharmacist before following any medical regimen to see if it is safe and effective for you

## 2019-02-27 NOTE — ASSESSMENT & PLAN NOTE
Currently being followed by Dr Rosa Diamond has ordered blood work  On methimazole 5mg daily  On Atenolol 50mg daily

## 2019-03-01 ENCOUNTER — APPOINTMENT (OUTPATIENT)
Dept: LAB | Facility: HOSPITAL | Age: 59
End: 2019-03-01
Payer: COMMERCIAL

## 2019-03-01 ENCOUNTER — TRANSCRIBE ORDERS (OUTPATIENT)
Dept: ADMINISTRATIVE | Facility: HOSPITAL | Age: 59
End: 2019-03-01

## 2019-03-01 DIAGNOSIS — E05.00 GRAVES' DISEASE: ICD-10-CM

## 2019-03-01 DIAGNOSIS — E05.00 GRAVES' DISEASE: Primary | ICD-10-CM

## 2019-03-01 LAB
T3 SERPL-MCNC: 1 NG/ML (ref 0.6–1.8)
T4 FREE SERPL-MCNC: 0.69 NG/DL (ref 0.76–1.46)
TSH SERPL DL<=0.05 MIU/L-ACNC: 3.93 UIU/ML (ref 0.47–4.68)

## 2019-03-01 PROCEDURE — 36415 COLL VENOUS BLD VENIPUNCTURE: CPT

## 2019-03-01 PROCEDURE — 84439 ASSAY OF FREE THYROXINE: CPT

## 2019-03-01 PROCEDURE — 84480 ASSAY TRIIODOTHYRONINE (T3): CPT

## 2019-03-01 PROCEDURE — 84443 ASSAY THYROID STIM HORMONE: CPT

## 2019-03-13 ENCOUNTER — CONSULT (OUTPATIENT)
Dept: FAMILY MEDICINE CLINIC | Facility: CLINIC | Age: 59
End: 2019-03-13

## 2019-03-13 VITALS
TEMPERATURE: 97.9 F | WEIGHT: 143 LBS | HEART RATE: 69 BPM | BODY MASS INDEX: 27 KG/M2 | OXYGEN SATURATION: 97 % | HEIGHT: 61 IN | DIASTOLIC BLOOD PRESSURE: 78 MMHG | SYSTOLIC BLOOD PRESSURE: 132 MMHG | RESPIRATION RATE: 18 BRPM

## 2019-03-13 DIAGNOSIS — Z12.39 SCREENING FOR BREAST CANCER: ICD-10-CM

## 2019-03-13 DIAGNOSIS — Z01.810 PRE-OPERATIVE CARDIOVASCULAR EXAMINATION: Primary | ICD-10-CM

## 2019-03-13 DIAGNOSIS — K08.9 POOR DENTITION: ICD-10-CM

## 2019-03-13 PROCEDURE — 99213 OFFICE O/P EST LOW 20 MIN: CPT | Performed by: PHYSICIAN ASSISTANT

## 2019-03-13 NOTE — PROGRESS NOTES
FAMILY PRACTICE PRE-OPERATIVE EVALUATION  [facility] [place]    NAME: Javid Redmond  AGE: 63 y o  SEX: female  : 1960     DATE: 3/13/2019    Family Practice Pre-Operative Evaluation      Chief Complaint: Pre-operative Evaluation     Surgery: Extraction of 4 teeth   Anticipated Date of Surgery: Within the next 2 weeks     History of Present Illness:     Patient has no prior history of bleeding issues or blood clots  Chronic conditions, medications and allergies were reviewed  There is no currently active infectious process  Assessment of Cardiac Risk:  · No unstable or severe angina or MI in the last 6 weeks or history of stent placement in the last year   · No decompensated heart failure (e g  New onset heart failure, NYHA functional class IV heart failure, or worsening existing heart failure) in past 3 mos  · No severe heart valve disease including aortic stenosis or symptomatic mitral stenosis     Exercise Capacity:  · Able to walk 4 blocks without symptoms  · Able to walk 2 flights without symptoms    NO Prior Anesthesia Reactions       No prolonged steroid use in past 6 mos  P A  T  reviewed  EKG: NSR, 74 bpm  TSH, T4, T3 all within normal limits  Review of Systems:     Review of Systems   Constitutional: Negative for appetite change, fatigue, fever and unexpected weight change  HENT: Negative for congestion, ear pain, postnasal drip, rhinorrhea and sore throat  Eyes: Negative for pain and visual disturbance  Respiratory: Negative for cough, chest tightness and shortness of breath  Cardiovascular: Negative for chest pain, palpitations and leg swelling  Gastrointestinal: Negative for abdominal pain, constipation, diarrhea, nausea and vomiting  Genitourinary: Negative for difficulty urinating and dysuria  Musculoskeletal: Negative for arthralgias and back pain  Skin: Negative for rash     Neurological: Negative for dizziness, numbness and headaches  Psychiatric/Behavioral: Negative for behavioral problems  The patient is not nervous/anxious  Current Problem List:     Patient Active Problem List   Diagnosis    Graves disease    Anemia    Essential hypertension       Allergies: Allergies   Allergen Reactions    No Active Allergies        Current Medications:       Current Outpatient Medications:     ARTIFICIAL TEARS 1 4 % ophthalmic solution, , Disp: , Rfl:     atenolol (TENORMIN) 50 mg tablet, Take 1 tablet (50 mg total) by mouth daily, Disp: 30 tablet, Rfl: 3    [employer] ALLERGY RELIEF 10 MG tablet, TAKE 1 TABLET BY MOUTH DAILY, Disp: , Rfl: 3    ketotifen (ZADITOR) 0 025 % ophthalmic solution, , Disp: , Rfl:     methimazole (TAPAZOLE) 5 mg tablet, Take 1 tablet (5 mg total) by mouth daily, Disp: 30 tablet, Rfl: 0    olopatadine (PATANOL) 0 1 % ophthalmic solution, Apply 1 drop to eye, Disp: , Rfl:     SF 1 1 % GEL, , Disp: , Rfl:     Past Medical History:       No past medical history on file  No past surgical history on file  No family history on file       Social History     Socioeconomic History    Marital status: /Civil Union     Spouse name: Not on file    Number of children: Not on file    Years of education: Not on file    Highest education level: Not on file   Occupational History    Not on file   Social Needs    Financial resource strain: Not on file    Food insecurity:     Worry: Not on file     Inability: Not on file    Transportation needs:     Medical: Not on file     Non-medical: Not on file   Tobacco Use    Smoking status: Never Smoker    Smokeless tobacco: Never Used   Substance and Sexual Activity    Alcohol use: No    Drug use: No    Sexual activity: Not on file   Lifestyle    Physical activity:     Days per week: Not on file     Minutes per session: Not on file    Stress: Not on file   Relationships    Social connections:     Talks on phone: Not on file Gets together: Not on file     Attends [employer] service: Not on file     Active member of club or organization: Not on file     Attends meetings of clubs or organizations: Not on file     Relationship status: Not on file    Intimate partner violence:     Fear of current or ex partner: Not on file     Emotionally abused: Not on file     Physically abused: Not on file     Forced sexual activity: Not on file   Other Topics Concern    Not on file   Social History Narrative    No caffeine use        Physical Exam:     /78 (BP Location: Right arm, Patient Position: Sitting, Cuff Size: Standard)   Pulse 69   Temp 97 9 °F (36 6 °C) (Temporal)   Resp 18   Ht 5' 1" (1 549 m)   Wt 64 9 kg (143 lb)   SpO2 97%   BMI 27 02 kg/m²     Physical Exam   Constitutional: She is oriented to person, place, and time  She appears well-developed and well-nourished  HENT:   Head: Normocephalic and atraumatic  Right Ear: Tympanic membrane, external ear and ear canal normal    Left Ear: Tympanic membrane, external ear and ear canal normal    Nose: Nose normal    Mouth/Throat: Uvula is midline, oropharynx is clear and moist and mucous membranes are normal    Poor dentition noted  Eyes: Pupils are equal, round, and reactive to light  Conjunctivae and EOM are normal    Neck: Normal range of motion  Neck supple  Thyromegaly present  Cardiovascular: Normal rate, regular rhythm, normal heart sounds and intact distal pulses  No murmur heard  Pulmonary/Chest: Effort normal and breath sounds normal  She has no wheezes  Abdominal: Soft  Bowel sounds are normal  There is no tenderness  Musculoskeletal: Normal range of motion  She exhibits no edema  Neurological: She is alert and oriented to person, place, and time  She has normal reflexes  Skin: Skin is warm and dry  Capillary refill takes less than 2 seconds  Psychiatric: She has a normal mood and affect   Her behavior is normal    Nursing note and vitals reviewed  Operative site has been examined and clear of skin infection and inflammation  Assessment & Recommendations:     Patient is cleared for surgery as detailed above  Surgical Procedure risk category: Low     Patient specific operative risk categegory: Low      All of patients questions were answered  Patient understands and agrees with the above plan       Lashae Romero PA-C  03/13/19

## 2019-03-22 ENCOUNTER — TELEPHONE (OUTPATIENT)
Dept: FAMILY MEDICINE CLINIC | Facility: CLINIC | Age: 59
End: 2019-03-22

## 2019-03-22 ENCOUNTER — HOSPITAL ENCOUNTER (OUTPATIENT)
Dept: MAMMOGRAPHY | Facility: HOSPITAL | Age: 59
Discharge: HOME | End: 2019-03-22
Payer: COMMERCIAL

## 2019-03-22 ENCOUNTER — TRANSCRIBE ORDERS (OUTPATIENT)
Dept: ADMINISTRATIVE | Facility: HOSPITAL | Age: 59
End: 2019-03-22

## 2019-03-22 VITALS — BODY MASS INDEX: 27 KG/M2 | HEIGHT: 61 IN | WEIGHT: 143 LBS

## 2019-03-22 DIAGNOSIS — Z12.39 SCREENING FOR MALIGNANT NEOPLASM OF BREAST: Primary | ICD-10-CM

## 2019-03-22 DIAGNOSIS — Z12.39 SCREENING FOR BREAST CANCER: ICD-10-CM

## 2019-03-22 PROCEDURE — 77067 SCR MAMMO BI INCL CAD: CPT

## 2019-03-22 NOTE — TELEPHONE ENCOUNTER
Portia Sawyer states she gave forms to [facility] for pt to have a dental procedure done

## 2019-03-22 NOTE — TELEPHONE ENCOUNTER
Gibran Carrasquillo,     She did give me the forms during her pre-op appt  I filled them out and faxed them to [facility] on 3/13/19  They are scanned into her chart if she would like a copy  Let me know if you need anything else  Thank you!

## 2019-05-24 ENCOUNTER — TRANSCRIBE ORDERS (OUTPATIENT)
Dept: ADMINISTRATIVE | Facility: HOSPITAL | Age: 59
End: 2019-05-24

## 2019-05-24 ENCOUNTER — APPOINTMENT (OUTPATIENT)
Dept: LAB | Facility: HOSPITAL | Age: 59
End: 2019-05-24
Payer: COMMERCIAL

## 2019-05-24 DIAGNOSIS — E05.00 GRAVES' DISEASE: ICD-10-CM

## 2019-05-24 DIAGNOSIS — E05.00 GRAVES' DISEASE: Primary | ICD-10-CM

## 2019-05-24 LAB
T4 FREE SERPL-MCNC: 0.83 NG/DL (ref 0.76–1.46)
TSH SERPL DL<=0.05 MIU/L-ACNC: 1.88 UIU/ML (ref 0.47–4.68)

## 2019-05-24 PROCEDURE — 84439 ASSAY OF FREE THYROXINE: CPT

## 2019-05-24 PROCEDURE — 84443 ASSAY THYROID STIM HORMONE: CPT

## 2019-05-24 PROCEDURE — 84445 ASSAY OF TSI GLOBULIN: CPT

## 2019-05-24 PROCEDURE — 36415 COLL VENOUS BLD VENIPUNCTURE: CPT

## 2019-05-28 LAB — TSI SER-ACNC: 1.31 IU/L (ref 0–0.55)

## 2019-07-03 PROBLEM — Z01.419 ENCOUNTER FOR GYNECOLOGICAL EXAMINATION: Status: ACTIVE | Noted: 2019-07-03

## 2019-07-03 NOTE — PROGRESS NOTES
ANNUAL GYNECOLOGICAL EXAMINATION    Davonte Rosario is a 63 y o  female who presents today for annual GYN exam   Her last pap smear was performed 2018 and was normal with negative HPV  She reports no history of abnormal pap smears in her past  She contraceptive method is post menopause  Her general medical history has been reviewed and she reports it as follows:    Past Medical History:   Diagnosis Date    Graves disease     Hypertension      History reviewed  No pertinent surgical history  OB History        4    Para   4    Term   4            AB        Living           SAB        TAB        Ectopic        Multiple        Live Births                   Social History     Tobacco Use    Smoking status: Never Smoker    Smokeless tobacco: Never Used   Substance Use Topics    Alcohol use: No    Drug use: No     Cancer-related family history is not on file  Current Outpatient Medications:     ARTIFICIAL TEARS 1 4 % ophthalmic solution, , Disp: , Rfl:     atenolol (TENORMIN) 50 mg tablet, Take 1 tablet (50 mg total) by mouth daily, Disp: 30 tablet, Rfl: 3    [employer] ALLERGY RELIEF 10 MG tablet, TAKE 1 TABLET BY MOUTH DAILY, Disp: , Rfl: 3    ketotifen (ZADITOR) 0 025 % ophthalmic solution, , Disp: , Rfl:     methimazole (TAPAZOLE) 5 mg tablet, Take 1 tablet (5 mg total) by mouth daily, Disp: 30 tablet, Rfl: 3    olopatadine (PATANOL) 0 1 % ophthalmic solution, Apply 1 drop to eye, Disp: , Rfl:     SF 1 1 % GEL, , Disp: , Rfl:     Review of Systems:  Review of Systems   Constitutional: Negative for appetite change, chills, fatigue and fever  Respiratory: Negative for chest tightness  Cardiovascular: Negative for chest pain  Gastrointestinal: Negative for abdominal pain, constipation, diarrhea, nausea and vomiting  Genitourinary: Negative for dyspareunia, dysuria, hematuria, menstrual problem, pelvic pain, vaginal bleeding, vaginal discharge and vaginal pain     Skin: Negative for rash    Neurological: Negative for weakness  Hematological: Negative for adenopathy  Physical Exam:    /70 (BP Location: Right arm, Patient Position: Sitting, Cuff Size: Standard)   Pulse 60   Temp 98 °F (36 7 °C) (Temporal)   Resp 18   Ht 5' 1" (1 549 m)   Wt 64 kg (141 lb)   SpO2 98%   BMI 26 64 kg/m²       Physical Exam   Constitutional: She is oriented to person, place, and time  She appears well-developed and well-nourished  No distress  HENT:   Head: Normocephalic and atraumatic  Eyes: Pupils are equal, round, and reactive to light  Conjunctivae are normal    Neck: Normal range of motion  Neck supple  Cardiovascular: Normal rate, regular rhythm and normal heart sounds  Exam reveals no gallop and no friction rub  No murmur heard  Pulmonary/Chest: Effort normal and breath sounds normal  No respiratory distress  She has no wheezes  She has no rales  Abdominal: Soft  Bowel sounds are normal  She exhibits no distension  There is no tenderness  Genitourinary:   Genitourinary Comments: Deferred breast and pelvic exam   Musculoskeletal: Normal range of motion  She exhibits no edema  Neurological: She is alert and oriented to person, place, and time  Skin: Skin is warm and dry  No rash noted  She is not diaphoretic  No erythema         Assessment and plan:    Essential hypertension  Well control, blood pressure 130/70  Given the patient's current risk factors, blood pressure goal of less than 150/90  Will continue with atenolol 50 mg once daily    Graves disease  Controlled based on last TSH  Will continue with methimazole 5 mg daily  Patient is currently following up with Endocrinology    Encounter for gynecological examination  Previous Pap smear was on 01/26/2018 and was normal with negative HPV  Next Pap would be in January 2023  Will hold off on doing the during this visit  Patient deferred breast exam given her most recent mammogram was about 3 months ago and was negative    Screening for colon cancer  Previous colonoscopy was on 10/29/2013 and was negative as per patient  Next colonoscopy would be due in [address] Graham Wilkins MD  07/05/19  3:42 PM

## 2019-07-05 ENCOUNTER — ANNUAL EXAM (OUTPATIENT)
Dept: FAMILY MEDICINE CLINIC | Facility: CLINIC | Age: 59
End: 2019-07-05

## 2019-07-05 VITALS
HEART RATE: 60 BPM | DIASTOLIC BLOOD PRESSURE: 70 MMHG | WEIGHT: 141 LBS | HEIGHT: 61 IN | TEMPERATURE: 98 F | SYSTOLIC BLOOD PRESSURE: 130 MMHG | OXYGEN SATURATION: 98 % | BODY MASS INDEX: 26.62 KG/M2 | RESPIRATION RATE: 18 BRPM

## 2019-07-05 DIAGNOSIS — Z01.419 ENCOUNTER FOR GYNECOLOGICAL EXAMINATION: Primary | ICD-10-CM

## 2019-07-05 DIAGNOSIS — Z12.11 SCREENING FOR COLON CANCER: ICD-10-CM

## 2019-07-05 DIAGNOSIS — I10 ESSENTIAL HYPERTENSION: ICD-10-CM

## 2019-07-05 DIAGNOSIS — E05.00 GRAVES DISEASE: ICD-10-CM

## 2019-07-05 PROCEDURE — S0612 ANNUAL GYNECOLOGICAL EXAMINA: HCPCS | Performed by: FAMILY MEDICINE

## 2019-07-05 RX ORDER — ATENOLOL 50 MG/1
50 TABLET ORAL DAILY
Qty: 30 TABLET | Refills: 3 | Status: SHIPPED | OUTPATIENT
Start: 2019-07-05 | End: 2020-01-29 | Stop reason: SDUPTHER

## 2019-07-05 RX ORDER — METHIMAZOLE 5 MG/1
5 TABLET ORAL DAILY
Qty: 30 TABLET | Refills: 3 | Status: SHIPPED | OUTPATIENT
Start: 2019-07-05 | End: 2020-01-27 | Stop reason: SDUPTHER

## 2019-07-05 RX ORDER — AMOXICILLIN 500 MG/1
CAPSULE ORAL
Refills: 0 | COMMUNITY
Start: 2019-04-26 | End: 2019-07-05 | Stop reason: ALTCHOICE

## 2019-07-05 RX ORDER — ACETAMINOPHEN AND CODEINE PHOSPHATE 300; 30 MG/1; MG/1
1 TABLET ORAL EVERY 6 HOURS PRN
Refills: 0 | COMMUNITY
Start: 2019-04-26 | End: 2019-07-05 | Stop reason: ALTCHOICE

## 2019-07-05 NOTE — ASSESSMENT & PLAN NOTE
Well control, blood pressure 130/70  Given the patient's current risk factors, blood pressure goal of less than 150/90  Will continue with atenolol 50 mg once daily

## 2019-07-05 NOTE — ASSESSMENT & PLAN NOTE
Controlled based on last TSH  Will continue with methimazole 5 mg daily  Patient is currently following up with Endocrinology

## 2019-07-05 NOTE — ASSESSMENT & PLAN NOTE
Previous Pap smear was on 01/26/2018 and was normal with negative HPV  Next Pap would be in January 2023  Will hold off on doing the during this visit  Patient deferred breast exam given her most recent mammogram was about 3 months ago and was negative

## 2019-07-05 NOTE — ASSESSMENT & PLAN NOTE
Previous colonoscopy was on 10/29/2013 and was negative as per patient  Next colonoscopy would be due in 2023 Speech Language Pathology  [facility]  DAILY TREATMENT NOTE    Date: 1/10/2018  Patients Name:  Paulina Traore  YOB: 2013 (2 y.o.)  Gender:  female  MRN:  [id number]  Account #: [de-identified]    

## 2019-07-30 ENCOUNTER — TRANSCRIBE ORDERS (OUTPATIENT)
Dept: ADMINISTRATIVE | Facility: HOSPITAL | Age: 59
End: 2019-07-30

## 2019-07-30 ENCOUNTER — APPOINTMENT (OUTPATIENT)
Dept: LAB | Facility: HOSPITAL | Age: 59
End: 2019-07-30
Payer: COMMERCIAL

## 2019-07-30 DIAGNOSIS — E05.00 GRAVES' DISEASE: ICD-10-CM

## 2019-07-30 DIAGNOSIS — E05.00 GRAVES' DISEASE: Primary | ICD-10-CM

## 2019-07-30 LAB
ALBUMIN SERPL BCP-MCNC: 4.8 G/DL (ref 3–5.2)
ALP SERPL-CCNC: 55 U/L (ref 43–122)
ALT SERPL W P-5'-P-CCNC: 17 U/L (ref 9–52)
ANION GAP SERPL CALCULATED.3IONS-SCNC: 7 MMOL/L (ref 5–14)
AST SERPL W P-5'-P-CCNC: 28 U/L (ref 14–36)
BASOPHILS # BLD AUTO: 0.1 THOUSANDS/ΜL (ref 0–0.1)
BASOPHILS NFR BLD AUTO: 1 % (ref 0–1)
BILIRUB SERPL-MCNC: 1.3 MG/DL
BUN SERPL-MCNC: 10 MG/DL (ref 5–25)
CALCIUM SERPL-MCNC: 9.5 MG/DL (ref 8.4–10.2)
CHLORIDE SERPL-SCNC: 102 MMOL/L (ref 97–108)
CO2 SERPL-SCNC: 34 MMOL/L (ref 22–30)
CREAT SERPL-MCNC: 0.66 MG/DL (ref 0.6–1.2)
EOSINOPHIL # BLD AUTO: 0.1 THOUSAND/ΜL (ref 0–0.4)
EOSINOPHIL NFR BLD AUTO: 1 % (ref 0–6)
ERYTHROCYTE [DISTWIDTH] IN BLOOD BY AUTOMATED COUNT: 13.9 %
GFR SERPL CREATININE-BSD FRML MDRD: 113 ML/MIN/1.73SQ M
GLUCOSE P FAST SERPL-MCNC: 87 MG/DL (ref 70–99)
HCT VFR BLD AUTO: 40.7 % (ref 36–46)
HGB BLD-MCNC: 13.3 G/DL (ref 12–16)
LYMPHOCYTES # BLD AUTO: 2.4 THOUSANDS/ΜL (ref 0.5–4)
LYMPHOCYTES NFR BLD AUTO: 36 % (ref 25–45)
MCH RBC QN AUTO: 26.8 PG (ref 26–34)
MCHC RBC AUTO-ENTMCNC: 32.6 G/DL (ref 31–36)
MCV RBC AUTO: 82 FL (ref 80–100)
MONOCYTES # BLD AUTO: 0.5 THOUSAND/ΜL (ref 0.2–0.9)
MONOCYTES NFR BLD AUTO: 7 % (ref 1–10)
NEUTROPHILS # BLD AUTO: 3.6 THOUSANDS/ΜL (ref 1.8–7.8)
NEUTS SEG NFR BLD AUTO: 55 % (ref 45–65)
PLATELET # BLD AUTO: 291 THOUSANDS/UL (ref 150–450)
PMV BLD AUTO: 9.5 FL (ref 8.9–12.7)
POTASSIUM SERPL-SCNC: 4.4 MMOL/L (ref 3.6–5)
PROT SERPL-MCNC: 8.6 G/DL (ref 5.9–8.4)
RBC # BLD AUTO: 4.95 MILLION/UL (ref 4–5.2)
SODIUM SERPL-SCNC: 143 MMOL/L (ref 137–147)
T3 SERPL-MCNC: 1.3 NG/ML (ref 0.6–1.8)
T4 FREE SERPL-MCNC: 0.91 NG/DL (ref 0.76–1.46)
TSH SERPL DL<=0.05 MIU/L-ACNC: 1.29 UIU/ML (ref 0.47–4.68)
WBC # BLD AUTO: 6.6 THOUSAND/UL (ref 4.5–11)

## 2019-07-30 PROCEDURE — 84445 ASSAY OF TSI GLOBULIN: CPT

## 2019-07-30 PROCEDURE — 80053 COMPREHEN METABOLIC PANEL: CPT

## 2019-07-30 PROCEDURE — 84480 ASSAY TRIIODOTHYRONINE (T3): CPT

## 2019-07-30 PROCEDURE — 84439 ASSAY OF FREE THYROXINE: CPT

## 2019-07-30 PROCEDURE — 85025 COMPLETE CBC W/AUTO DIFF WBC: CPT

## 2019-07-30 PROCEDURE — 84443 ASSAY THYROID STIM HORMONE: CPT

## 2019-07-30 PROCEDURE — 36415 COLL VENOUS BLD VENIPUNCTURE: CPT

## 2019-08-01 LAB — TSI SER-ACNC: 0.89 IU/L (ref 0–0.55)

## 2019-10-10 ENCOUNTER — OFFICE VISIT (OUTPATIENT)
Dept: FAMILY MEDICINE CLINIC | Facility: CLINIC | Age: 59
End: 2019-10-10

## 2019-10-10 VITALS
HEIGHT: 61 IN | TEMPERATURE: 97 F | SYSTOLIC BLOOD PRESSURE: 130 MMHG | OXYGEN SATURATION: 98 % | BODY MASS INDEX: 26.43 KG/M2 | RESPIRATION RATE: 18 BRPM | DIASTOLIC BLOOD PRESSURE: 80 MMHG | WEIGHT: 140 LBS | HEART RATE: 83 BPM

## 2019-10-10 DIAGNOSIS — E05.00 GRAVES DISEASE: ICD-10-CM

## 2019-10-10 DIAGNOSIS — I10 ESSENTIAL HYPERTENSION: ICD-10-CM

## 2019-10-10 DIAGNOSIS — Z23 NEED FOR VACCINATION: Primary | ICD-10-CM

## 2019-10-10 PROCEDURE — 99213 OFFICE O/P EST LOW 20 MIN: CPT | Performed by: INTERNAL MEDICINE

## 2019-10-10 PROCEDURE — 3008F BODY MASS INDEX DOCD: CPT | Performed by: INTERNAL MEDICINE

## 2019-10-10 PROCEDURE — 90471 IMMUNIZATION ADMIN: CPT | Performed by: INTERNAL MEDICINE

## 2019-10-10 PROCEDURE — 90472 IMMUNIZATION ADMIN EACH ADD: CPT | Performed by: INTERNAL MEDICINE

## 2019-10-10 PROCEDURE — 3079F DIAST BP 80-89 MM HG: CPT | Performed by: INTERNAL MEDICINE

## 2019-10-10 PROCEDURE — 1036F TOBACCO NON-USER: CPT | Performed by: INTERNAL MEDICINE

## 2019-10-10 PROCEDURE — 90682 RIV4 VACC RECOMBINANT DNA IM: CPT | Performed by: INTERNAL MEDICINE

## 2019-10-10 PROCEDURE — 90715 TDAP VACCINE 7 YRS/> IM: CPT | Performed by: INTERNAL MEDICINE

## 2019-10-10 PROCEDURE — 3075F SYST BP GE 130 - 139MM HG: CPT | Performed by: INTERNAL MEDICINE

## 2019-10-10 RX ORDER — DEXTRAN 70, AND HYPROMELLOSE 2910 1; 3 MG/ML; MG/ML
SOLUTION/ DROPS OPHTHALMIC
COMMUNITY
Start: 2019-09-17

## 2019-10-10 NOTE — ASSESSMENT & PLAN NOTE
BP Readings from Last 1 Encounters:   10/10/19 130/80   Currently controlled  Continue current management  Reassess at next visit

## 2019-10-10 NOTE — PATIENT INSTRUCTIONS
Diphtheria/Acellular Pertussis/Tetanus Booster Vaccine (Tdap) (By injection)   Pertussis Vaccine, Acellular (per-TUS-iss VAX-een, i-AEKN-xtb-lar), Reduced Diphtheria Toxoid (ree-DOOST dif-THEER-ee-a TOX-oyd), Tetanus Toxoid (TET-a-nus TOX-oyd)  Protects against infections caused by tetanus (lockjaw), diphtheria, or pertussis (whooping cough)  This is a booster vaccine  Brand Name(s): Adacel, Boostrix   There may be other brand names for this medicine  When This Medicine Should Not Be Used: You should not receive this vaccine if you have had an allergic reaction to the separate or combined tetanus, diphtheria, or pertussis vaccine  You should not receive this vaccine if you have had seizures, mental changes, or any other serious reaction within 7 days after you received a pertussis vaccine  How to Use This Medicine:   Injectable  · A nurse or other health provider will give you this medicine  · Your doctor will prescribe your exact dose and tell you how often it should be given  This medicine is given as a shot into one of your muscles  · You may receive other vaccines at the same time as this one, but in a different body area  You should receive patient instructions for all of the vaccines  Talk to your doctor or nurse if you have questions  · Read and follow the patient instructions that come with this medicine  Talk to your doctor or pharmacist if you have any questions  Drugs and Foods to Avoid:   Ask your doctor or pharmacist before using any other medicine, including over-the-counter medicines, vitamins, and herbal products  · Make sure the doctor knows if you are receiving a treatment or medicine that weakens your immune system  This includes radiation treatment, steroid medicines (such as dexamethasone, hydrocortisone, methylprednisolone, prednisolone, prednisone, Medrol®), or cancer medicines    Warnings While Using This Medicine:   · Make sure your doctor knows if you are pregnant or breastfeeding or have epilepsy, a weak immune system, or a history of a stroke  Tell your doctor if you are sick or have a fever  · Tell your doctor about any reaction you had after you received a vaccine  This includes fainting, seizures, a fever over 105 degrees F, or severe redness or swelling where the shot was given  Tell your doctor if you have a history of Guillain-Barré syndrome after you received a vaccine with tetanus  · Call your doctor right away if you faint or have vision changes, numbness or tingling in your arms, hands, or feet, or a seizure after you receive this vaccine  · Tell your doctor if you have an allergy to latex  The syringes may contain dry natural latex rubber  · This vaccine will not treat an active infection  If you have a diphtheria, tetanus, or pertussis infection, you will need medicine to treat the infection  Possible Side Effects While Using This Medicine:   Call your doctor right away if you notice any of these side effects:  · Allergic reaction: Itching or hives, swelling in your face or hands, swelling or tingling in your mouth or throat, chest tightness, trouble breathing  · Changes in vision  · Fever over 105 degrees F  · Lightheadedness or fainting  · Numbness, tingling, or burning pain in your hands, arms, legs, or feet  · Seizures  · Sudden numbness or weakness in your arms or legs  · Severe pain, redness, or swelling where the shot was given  If you notice these less serious side effects, talk with your doctor:   · Headache  · Mild pain, redness, or swelling where the shot was given  · Nausea, vomiting, diarrhea, or stomach pain  · Tiredness  If you notice other side effects that you think are caused by this medicine, tell your doctor  Call your doctor for medical advice about side effects   You may report side effects to FDA at 3-570-FDA-0895  © 2017 [employer] Information is for End User's use only and may not be sold, redistributed or otherwise used for commercial purposes  The above information is an  only  It is not intended as medical advice for individual conditions or treatments  Talk to your doctor, nurse or pharmacist before following any medical regimen to see if it is safe and effective for you

## 2019-10-10 NOTE — ASSESSMENT & PLAN NOTE
Lab Results   Component Value Date    [id number] 1 290 07/30/2019    H5YSUKS 1 30 07/30/2019   Currently controlled  Follows with endocrinology, Dr Mi Salts  Continue current management  Reassess at next visit

## 2019-10-10 NOTE — PROGRESS NOTES
Assessment/Plan:    Graves disease  Lab Results   Component Value Date    [id number] 1 290 07/30/2019    C1TIMEV 1 30 07/30/2019   Currently controlled  Follows with endocrinology, Dr Donald Hammans  Continue current management  Reassess at next visit  Essential hypertension  BP Readings from Last 1 Encounters:   10/10/19 130/80   Currently controlled  Continue current management  Reassess at next visit  Diagnoses and all orders for this visit:    Need for vaccination  -     influenza vaccine, [id number], quadrivalent, recombinant, PF, 0 5 mL, for patients 18 yr+ (FLUBLOK)  -     TDAP VACCINE GREATER THAN OR EQUAL TO 6YO IM    Other orders  -     NATURAL BALANCE TEARS 0 1-0 3 % ophthalmic solution          Subjective:      Patient ID: Dilcia Adams is a 63 y o  female  Who presents for follow up on their chronic conditions  No complaints  Patient Active Problem List:     Graves disease, well controlled, denies hot/cold intolerance, visual disturbance, agitation, skin dryness  Anemia, resolved, last Hb 13  Essential hypertension, well controlled, denies headache, changes in vision, chest pain or palpitations  The following portions of the patient's history were reviewed and updated as appropriate: allergies, current medications, past family history, past medical history, past social history, past surgical history and problem list     Review of Systems   Constitutional: Negative for chills and fever  HENT: Negative for ear pain and sore throat  Eyes: Negative for pain and redness  Respiratory: Negative for cough and shortness of breath  Cardiovascular: Negative for chest pain, palpitations and leg swelling  Gastrointestinal: Negative for abdominal pain, diarrhea and nausea  Endocrine: Negative for cold intolerance and heat intolerance  Genitourinary: Negative for dysuria and hematuria  Musculoskeletal: Negative for back pain and neck pain     Neurological: Negative for dizziness and headaches  Psychiatric/Behavioral: Negative for dysphoric mood  The patient is not nervous/anxious  Objective:      /80 (BP Location: Right arm, Patient Position: Sitting, Cuff Size: Standard)   Pulse 83   Temp (!) 97 °F (36 1 °C) (Temporal)   Resp 18   Ht 5' 1" (1 549 m)   Wt 63 5 kg (140 lb)   SpO2 98%   BMI 26 45 kg/m²          Physical Exam   Constitutional: She is oriented to person, place, and time  She appears well-developed and well-nourished  HENT:   Head: Normocephalic and atraumatic  Right Ear: External ear normal    Left Ear: External ear normal    Nose: Nose normal    Mouth/Throat: Oropharynx is clear and moist    Eyes: Pupils are equal, round, and reactive to light  Conjunctivae and EOM are normal    Neck: Normal range of motion  Neck supple  Cardiovascular: Normal rate, regular rhythm, normal heart sounds and intact distal pulses  Pulmonary/Chest: Effort normal and breath sounds normal    Abdominal: Soft  Bowel sounds are normal  There is no tenderness  Musculoskeletal: Normal range of motion  She exhibits no edema or tenderness  Lymphadenopathy:     She has no cervical adenopathy  Neurological: She is alert and oriented to person, place, and time  Skin: Skin is warm and dry  Psychiatric: She has a normal mood and affect   Her behavior is normal

## 2020-01-24 ENCOUNTER — TELEPHONE (OUTPATIENT)
Dept: FAMILY MEDICINE CLINIC | Facility: CLINIC | Age: 60
End: 2020-01-24

## 2020-01-24 DIAGNOSIS — E05.00 GRAVES DISEASE: ICD-10-CM

## 2020-01-24 NOTE — TELEPHONE ENCOUNTER
Patient called for refill on following medication        methimazole (TAPAZOLE) 5 mg tablet   atenolol (TENORMIN) 50 mg tablet

## 2020-01-27 RX ORDER — METHIMAZOLE 5 MG/1
5 TABLET ORAL DAILY
Qty: 30 TABLET | Refills: 3 | Status: SHIPPED | OUTPATIENT
Start: 2020-01-27 | End: 2020-07-21 | Stop reason: SDUPTHER

## 2020-01-29 ENCOUNTER — TELEPHONE (OUTPATIENT)
Dept: FAMILY MEDICINE CLINIC | Facility: CLINIC | Age: 60
End: 2020-01-29

## 2020-01-29 DIAGNOSIS — I10 ESSENTIAL HYPERTENSION: ICD-10-CM

## 2020-01-29 DIAGNOSIS — E05.00 GRAVES DISEASE: ICD-10-CM

## 2020-01-29 RX ORDER — ATENOLOL 50 MG/1
50 TABLET ORAL DAILY
Qty: 30 TABLET | Refills: 3 | Status: SHIPPED | OUTPATIENT
Start: 2020-01-29 | End: 2020-07-21 | Stop reason: SDUPTHER

## 2020-04-09 ENCOUNTER — TELEMEDICINE (OUTPATIENT)
Dept: FAMILY MEDICINE CLINIC | Facility: CLINIC | Age: 60
End: 2020-04-09

## 2020-04-09 DIAGNOSIS — I10 ESSENTIAL HYPERTENSION: ICD-10-CM

## 2020-04-09 DIAGNOSIS — E05.00 GRAVES DISEASE: Primary | ICD-10-CM

## 2020-04-09 PROCEDURE — G2012 BRIEF CHECK IN BY MD/QHP: HCPCS | Performed by: FAMILY MEDICINE

## 2020-04-09 RX ORDER — ADHESIVE BANDAGE 3/4"
BANDAGE TOPICAL DAILY
Qty: 1 EACH | Refills: 0 | Status: SHIPPED | OUTPATIENT
Start: 2020-04-09

## 2020-04-13 ENCOUNTER — APPOINTMENT (OUTPATIENT)
Dept: LAB | Facility: HOSPITAL | Age: 60
End: 2020-04-13
Payer: COMMERCIAL

## 2020-04-13 DIAGNOSIS — E05.00 GRAVES DISEASE: ICD-10-CM

## 2020-04-13 DIAGNOSIS — E78.2 MIXED HYPERLIPIDEMIA: Primary | ICD-10-CM

## 2020-04-13 DIAGNOSIS — I10 ESSENTIAL HYPERTENSION: ICD-10-CM

## 2020-04-13 LAB
ALBUMIN SERPL BCP-MCNC: 4.4 G/DL (ref 3–5.2)
ALP SERPL-CCNC: 53 U/L (ref 43–122)
ALT SERPL W P-5'-P-CCNC: 20 U/L (ref 9–52)
ANION GAP SERPL CALCULATED.3IONS-SCNC: 8 MMOL/L (ref 5–14)
AST SERPL W P-5'-P-CCNC: 29 U/L (ref 14–36)
BASOPHILS # BLD AUTO: 0 THOUSANDS/ΜL (ref 0–0.1)
BASOPHILS NFR BLD AUTO: 1 % (ref 0–1)
BILIRUB SERPL-MCNC: 0.8 MG/DL
BUN SERPL-MCNC: 10 MG/DL (ref 5–25)
CALCIUM SERPL-MCNC: 9.1 MG/DL (ref 8.4–10.2)
CHLORIDE SERPL-SCNC: 100 MMOL/L (ref 97–108)
CHOLEST SERPL-MCNC: 250 MG/DL
CO2 SERPL-SCNC: 33 MMOL/L (ref 22–30)
CREAT SERPL-MCNC: 0.58 MG/DL (ref 0.6–1.2)
EOSINOPHIL # BLD AUTO: 0.1 THOUSAND/ΜL (ref 0–0.4)
EOSINOPHIL NFR BLD AUTO: 3 % (ref 0–6)
ERYTHROCYTE [DISTWIDTH] IN BLOOD BY AUTOMATED COUNT: 14.5 %
GFR SERPL CREATININE-BSD FRML MDRD: 117 ML/MIN/1.73SQ M
GLUCOSE P FAST SERPL-MCNC: 87 MG/DL (ref 70–99)
HCT VFR BLD AUTO: 40.9 % (ref 36–46)
HDLC SERPL-MCNC: 37 MG/DL
HGB BLD-MCNC: 13.9 G/DL (ref 12–16)
LDLC SERPL CALC-MCNC: 194 MG/DL
LYMPHOCYTES # BLD AUTO: 1.9 THOUSANDS/ΜL (ref 0.5–4)
LYMPHOCYTES NFR BLD AUTO: 42 % (ref 25–45)
MCH RBC QN AUTO: 27.6 PG (ref 26–34)
MCHC RBC AUTO-ENTMCNC: 33.9 G/DL (ref 31–36)
MCV RBC AUTO: 82 FL (ref 80–100)
MONOCYTES # BLD AUTO: 0.5 THOUSAND/ΜL (ref 0.2–0.9)
MONOCYTES NFR BLD AUTO: 11 % (ref 1–10)
NEUTROPHILS # BLD AUTO: 2.1 THOUSANDS/ΜL (ref 1.8–7.8)
NEUTS SEG NFR BLD AUTO: 44 % (ref 45–65)
PLATELET # BLD AUTO: 254 THOUSANDS/UL (ref 150–450)
PMV BLD AUTO: 9.3 FL (ref 8.9–12.7)
POTASSIUM SERPL-SCNC: 4.8 MMOL/L (ref 3.6–5)
PROT SERPL-MCNC: 8.3 G/DL (ref 5.9–8.4)
RBC # BLD AUTO: 5.02 MILLION/UL (ref 4–5.2)
SODIUM SERPL-SCNC: 141 MMOL/L (ref 137–147)
TRIGL SERPL-MCNC: 96 MG/DL
TSH SERPL DL<=0.05 MIU/L-ACNC: 2.65 UIU/ML (ref 0.47–4.68)
WBC # BLD AUTO: 4.7 THOUSAND/UL (ref 4.5–11)

## 2020-04-13 PROCEDURE — 84443 ASSAY THYROID STIM HORMONE: CPT

## 2020-04-13 PROCEDURE — 36415 COLL VENOUS BLD VENIPUNCTURE: CPT

## 2020-04-13 PROCEDURE — 80053 COMPREHEN METABOLIC PANEL: CPT

## 2020-04-13 PROCEDURE — 85025 COMPLETE CBC W/AUTO DIFF WBC: CPT

## 2020-04-13 PROCEDURE — 80061 LIPID PANEL: CPT

## 2020-04-13 RX ORDER — ATORVASTATIN CALCIUM 40 MG/1
40 TABLET, FILM COATED ORAL DAILY
Qty: 30 TABLET | Refills: 0 | Status: SHIPPED | OUTPATIENT
Start: 2020-04-13 | End: 2020-05-13 | Stop reason: SDUPTHER

## 2020-05-13 DIAGNOSIS — E78.2 MIXED HYPERLIPIDEMIA: ICD-10-CM

## 2020-05-14 RX ORDER — ATORVASTATIN CALCIUM 40 MG/1
40 TABLET, FILM COATED ORAL DAILY
Qty: 30 TABLET | Refills: 0 | Status: SHIPPED | OUTPATIENT
Start: 2020-05-14 | End: 2020-06-18 | Stop reason: SDUPTHER

## 2020-06-18 DIAGNOSIS — E78.2 MIXED HYPERLIPIDEMIA: ICD-10-CM

## 2020-06-18 RX ORDER — ATORVASTATIN CALCIUM 40 MG/1
40 TABLET, FILM COATED ORAL DAILY
Qty: 30 TABLET | Refills: 0 | Status: SHIPPED | OUTPATIENT
Start: 2020-06-18 | End: 2020-07-21 | Stop reason: SDUPTHER

## 2020-07-17 ENCOUNTER — APPOINTMENT (OUTPATIENT)
Dept: LAB | Facility: HOSPITAL | Age: 60
End: 2020-07-17
Payer: COMMERCIAL

## 2020-07-17 ENCOUNTER — TRANSCRIBE ORDERS (OUTPATIENT)
Dept: LAB | Facility: HOSPITAL | Age: 60
End: 2020-07-17

## 2020-07-17 DIAGNOSIS — E05.00 BASEDOW'S DISEASE: Primary | ICD-10-CM

## 2020-07-17 DIAGNOSIS — E05.00 BASEDOW'S DISEASE: ICD-10-CM

## 2020-07-17 LAB
T4 FREE SERPL-MCNC: 0.89 NG/DL (ref 0.76–1.46)
TSH SERPL DL<=0.05 MIU/L-ACNC: 1.9 UIU/ML (ref 0.47–4.68)

## 2020-07-17 PROCEDURE — 84439 ASSAY OF FREE THYROXINE: CPT

## 2020-07-17 PROCEDURE — 36415 COLL VENOUS BLD VENIPUNCTURE: CPT

## 2020-07-17 PROCEDURE — 84443 ASSAY THYROID STIM HORMONE: CPT

## 2020-07-17 PROCEDURE — 84445 ASSAY OF TSI GLOBULIN: CPT

## 2020-07-19 LAB — TSI SER-ACNC: 0.43 IU/L (ref 0–0.55)

## 2020-07-21 DIAGNOSIS — E78.2 MIXED HYPERLIPIDEMIA: ICD-10-CM

## 2020-07-21 DIAGNOSIS — E05.00 GRAVES DISEASE: ICD-10-CM

## 2020-07-21 DIAGNOSIS — I10 ESSENTIAL HYPERTENSION: ICD-10-CM

## 2020-07-22 RX ORDER — METHIMAZOLE 5 MG/1
5 TABLET ORAL DAILY
Qty: 30 TABLET | Refills: 0 | Status: SHIPPED | OUTPATIENT
Start: 2020-07-22

## 2020-07-22 RX ORDER — ATENOLOL 50 MG/1
50 TABLET ORAL DAILY
Qty: 30 TABLET | Refills: 0 | Status: SHIPPED | OUTPATIENT
Start: 2020-07-22 | End: 2021-10-01 | Stop reason: SDUPTHER

## 2020-07-22 RX ORDER — ATORVASTATIN CALCIUM 40 MG/1
40 TABLET, FILM COATED ORAL DAILY
Qty: 30 TABLET | Refills: 0 | Status: SHIPPED | OUTPATIENT
Start: 2020-07-22 | End: 2020-08-27 | Stop reason: SDUPTHER

## 2020-08-27 DIAGNOSIS — E78.2 MIXED HYPERLIPIDEMIA: ICD-10-CM

## 2020-08-27 RX ORDER — ATORVASTATIN CALCIUM 40 MG/1
40 TABLET, FILM COATED ORAL DAILY
Qty: 30 TABLET | Refills: 0 | Status: SHIPPED | OUTPATIENT
Start: 2020-08-27 | End: 2020-09-29 | Stop reason: SDUPTHER

## 2020-08-27 NOTE — TELEPHONE ENCOUNTER
Refilled Lipitor 40mg #30 tablets    Please have pt make an appt with a provider within the next 1 month to evaluate chronic conditions, thank you

## 2020-09-18 ENCOUNTER — OFFICE VISIT (OUTPATIENT)
Dept: FAMILY MEDICINE CLINIC | Facility: CLINIC | Age: 60
End: 2020-09-18

## 2020-09-18 VITALS
TEMPERATURE: 97.1 F | OXYGEN SATURATION: 98 % | DIASTOLIC BLOOD PRESSURE: 70 MMHG | SYSTOLIC BLOOD PRESSURE: 116 MMHG | HEART RATE: 62 BPM | WEIGHT: 145 LBS | RESPIRATION RATE: 16 BRPM | BODY MASS INDEX: 27.4 KG/M2

## 2020-09-18 DIAGNOSIS — E78.2 MIXED HYPERLIPIDEMIA: ICD-10-CM

## 2020-09-18 DIAGNOSIS — I83.813 VARICOSE VEINS OF BOTH LOWER EXTREMITIES WITH PAIN: ICD-10-CM

## 2020-09-18 DIAGNOSIS — I10 ESSENTIAL HYPERTENSION: ICD-10-CM

## 2020-09-18 DIAGNOSIS — E05.00 GRAVES DISEASE: ICD-10-CM

## 2020-09-18 DIAGNOSIS — Z23 NEED FOR VACCINATION: Primary | ICD-10-CM

## 2020-09-18 DIAGNOSIS — I83.893 VARICOSE VEINS OF BILATERAL LOWER EXTREMITIES WITH OTHER COMPLICATIONS: ICD-10-CM

## 2020-09-18 PROBLEM — I83.93 VARICOSE VEINS OF BOTH LOWER EXTREMITIES: Status: ACTIVE | Noted: 2020-09-18

## 2020-09-18 PROBLEM — E78.5 HYPERLIPIDEMIA: Status: ACTIVE | Noted: 2020-09-18

## 2020-09-18 PROCEDURE — 90682 RIV4 VACC RECOMBINANT DNA IM: CPT

## 2020-09-18 PROCEDURE — 90471 IMMUNIZATION ADMIN: CPT

## 2020-09-18 PROCEDURE — 99213 OFFICE O/P EST LOW 20 MIN: CPT | Performed by: FAMILY MEDICINE

## 2020-09-18 PROCEDURE — 1036F TOBACCO NON-USER: CPT | Performed by: FAMILY MEDICINE

## 2020-09-18 NOTE — ASSESSMENT & PLAN NOTE
Today's BP: 116/70  At goal per JNC-8 guidelines  No dizziness, blurred vision, or headache      - Continue Atenolol 50 mg QD   - Continue to monitor

## 2020-09-18 NOTE — ASSESSMENT & PLAN NOTE
Lipid Panel from 4/13/2020: Total Cholesterol 250, Triglycerides 96, HDL 37,       - Continue Lipitor 40 mg QD   - Consider repeating lipid panel at next visit to monitor lipid levels

## 2020-09-18 NOTE — PROGRESS NOTES
Assessment/Plan:    Graves disease    7/17/20: TSH/T4/Thyroid Stimulating Immunoglobulin all normal      - Continue Methimazole 5 mg QD & Atenolol 50 mg QD   - Continue with Patanol & Zaditor eye drops PRN  - Follows with endocrinologist Dr Arsenio Mckinley  - Follow up in 3 months  Essential hypertension  Today's BP: 116/70  At goal per JNC-8 guidelines  No dizziness, blurred vision, or headache      - Continue Atenolol 50 mg QD   - Continue to monitor  Hyperlipidemia  Lipid Panel from 4/13/2020: Total Cholesterol 250, Triglycerides 96, HDL 37,       - Continue Lipitor 40 mg QD   - Consider repeating lipid panel at next visit to monitor lipid levels  Varicose veins of both lower extremities  Present on lower legs bilaterally  Painful  - Referral to vascular surgery made  Diagnoses and all orders for this visit:    Need for vaccination  -     influenza vaccine, quadrivalent, recombinant, PF, 0 5 mL, for patients 18 yr+ (FLUBLOK)    Graves disease  -     Cancel: Ambulatory referral to Endocrinology; Future    Varicose veins of bilateral lower extremities with other complications    Essential hypertension    Varicose veins of both lower extremities with pain  -     Ambulatory referral to Vascular Surgery; Future    Mixed hyperlipidemia          Subjective:      Patient ID: Faith Seaman is a 63 y o  female  A pleasant 63year old female with a PMH of Graves disease, hypertension, and hyperlipidemia came to the clinic today for a follow-up for her chronic conditions  Blood pressure during today's visit was 116/70  She has no palpitations, anxiety, tremor, sweating, heat intolerance, increased defecation, dyspnea, orthopnea, leg swelling, weight loss, change in appetite, or insomnia  She does complain of varicose veins which have been present for a while now, and she says they are painful; a referral to vascular surgery was placed for this  She also received her flu shot today   She is otherwise doing well & has no other complaints  The following portions of the patient's history were reviewed and updated as appropriate: allergies, current medications, past family history, past medical history, past social history, past surgical history and problem list     Review of Systems   Constitutional: Negative for activity change, appetite change, chills and fever  HENT: Negative for sore throat  Respiratory: Negative for cough and shortness of breath  Cardiovascular: Negative for chest pain, palpitations and leg swelling  Gastrointestinal: Negative for abdominal pain, constipation, diarrhea, nausea and vomiting  Musculoskeletal: Negative for back pain and gait problem  Skin:        Varicose veins on lower legs  Neurological: Negative for dizziness, seizures, weakness and headaches  Objective:      /70 (BP Location: Left arm, Patient Position: Sitting, Cuff Size: Adult)   Pulse 62   Temp (!) 97 1 °F (36 2 °C) (Temporal)   Resp 16   Wt 65 8 kg (145 lb)   SpO2 98%   BMI 27 40 kg/m²          Physical Exam  Vitals signs reviewed  Constitutional:       General: She is not in acute distress  Appearance: She is well-developed  She is not diaphoretic  HENT:      Head: Normocephalic and atraumatic  Eyes:      General:         Right eye: No discharge  Left eye: No discharge  Conjunctiva/sclera: Conjunctivae normal       Comments: No exophthalmos noted  Neck:      Musculoskeletal: Normal range of motion  Cardiovascular:      Rate and Rhythm: Normal rate and regular rhythm  Heart sounds: Normal heart sounds  No murmur  No friction rub  No gallop  Pulmonary:      Effort: Pulmonary effort is normal  No respiratory distress  Breath sounds: Normal breath sounds  No wheezing or rales  Abdominal:      General: Bowel sounds are normal  There is no distension  Palpations: Abdomen is soft  There is no mass  Tenderness:  There is no abdominal tenderness  There is no guarding  Musculoskeletal: Normal range of motion  General: No tenderness or deformity  Skin:     General: Skin is warm and dry  Comments: Varicose veins noted on lower legs bilaterally  Neurological:      Mental Status: She is alert and oriented to person, place, and time           Brigitte Shay MD   9/18/20

## 2020-09-18 NOTE — ASSESSMENT & PLAN NOTE
7/17/20: TSH/T4/Thyroid Stimulating Immunoglobulin all normal      - Continue Methimazole 5 mg QD & Atenolol 50 mg QD   - Continue with Patanol & Zaditor eye drops PRN  - Follows with endocrinologist Dr Jackie Solares  - Follow up in 3 months

## 2020-09-29 DIAGNOSIS — E78.2 MIXED HYPERLIPIDEMIA: ICD-10-CM

## 2020-09-29 RX ORDER — ATORVASTATIN CALCIUM 40 MG/1
40 TABLET, FILM COATED ORAL DAILY
Qty: 30 TABLET | Refills: 0 | Status: SHIPPED | OUTPATIENT
Start: 2020-09-29 | End: 2020-10-30 | Stop reason: SDUPTHER

## 2020-10-30 ENCOUNTER — LAB (OUTPATIENT)
Dept: LAB | Facility: HOSPITAL | Age: 60
End: 2020-10-30
Payer: COMMERCIAL

## 2020-10-30 ENCOUNTER — TRANSCRIBE ORDERS (OUTPATIENT)
Dept: LAB | Facility: HOSPITAL | Age: 60
End: 2020-10-30

## 2020-10-30 DIAGNOSIS — D64.9 ANEMIA, UNSPECIFIED TYPE: ICD-10-CM

## 2020-10-30 DIAGNOSIS — E78.2 MIXED HYPERLIPIDEMIA: ICD-10-CM

## 2020-10-30 DIAGNOSIS — E05.00 GRAVES DISEASE: Primary | ICD-10-CM

## 2020-10-30 DIAGNOSIS — E05.00 GRAVES DISEASE: ICD-10-CM

## 2020-10-30 LAB
ALBUMIN SERPL BCP-MCNC: 4.4 G/DL (ref 3–5.2)
ALP SERPL-CCNC: 53 U/L (ref 43–122)
ALT SERPL W P-5'-P-CCNC: 22 U/L (ref 9–52)
ANION GAP SERPL CALCULATED.3IONS-SCNC: 5 MMOL/L (ref 5–14)
AST SERPL W P-5'-P-CCNC: 30 U/L (ref 14–36)
BASOPHILS # BLD AUTO: 0 THOUSANDS/ΜL (ref 0–0.1)
BASOPHILS NFR BLD AUTO: 1 % (ref 0–1)
BILIRUB SERPL-MCNC: 1.1 MG/DL
BUN SERPL-MCNC: 12 MG/DL (ref 5–25)
CALCIUM SERPL-MCNC: 9.5 MG/DL (ref 8.4–10.2)
CHLORIDE SERPL-SCNC: 104 MMOL/L (ref 97–108)
CO2 SERPL-SCNC: 31 MMOL/L (ref 22–30)
CREAT SERPL-MCNC: 0.69 MG/DL (ref 0.6–1.2)
EOSINOPHIL # BLD AUTO: 0.1 THOUSAND/ΜL (ref 0–0.4)
EOSINOPHIL NFR BLD AUTO: 2 % (ref 0–6)
ERYTHROCYTE [DISTWIDTH] IN BLOOD BY AUTOMATED COUNT: 14.2 %
GFR SERPL CREATININE-BSD FRML MDRD: 111 ML/MIN/1.73SQ M
GLUCOSE P FAST SERPL-MCNC: 90 MG/DL (ref 70–99)
HCT VFR BLD AUTO: 39.6 % (ref 36–46)
HGB BLD-MCNC: 13.4 G/DL (ref 12–16)
LYMPHOCYTES # BLD AUTO: 1.8 THOUSANDS/ΜL (ref 0.5–4)
LYMPHOCYTES NFR BLD AUTO: 39 % (ref 25–45)
MCH RBC QN AUTO: 27.4 PG (ref 26–34)
MCHC RBC AUTO-ENTMCNC: 33.8 G/DL (ref 31–36)
MCV RBC AUTO: 81 FL (ref 80–100)
MONOCYTES # BLD AUTO: 0.4 THOUSAND/ΜL (ref 0.2–0.9)
MONOCYTES NFR BLD AUTO: 8 % (ref 1–10)
NEUTROPHILS # BLD AUTO: 2.4 THOUSANDS/ΜL (ref 1.8–7.8)
NEUTS SEG NFR BLD AUTO: 51 % (ref 45–65)
PLATELET # BLD AUTO: 259 THOUSANDS/UL (ref 150–450)
PMV BLD AUTO: 9.4 FL (ref 8.9–12.7)
POTASSIUM SERPL-SCNC: 4.5 MMOL/L (ref 3.6–5)
PROT SERPL-MCNC: 7.9 G/DL (ref 5.9–8.4)
RBC # BLD AUTO: 4.89 MILLION/UL (ref 4–5.2)
SODIUM SERPL-SCNC: 140 MMOL/L (ref 137–147)
T3 SERPL-MCNC: 1.1 NG/ML (ref 0.6–1.8)
T4 FREE SERPL-MCNC: 0.84 NG/DL (ref 0.76–1.46)
TSH SERPL DL<=0.05 MIU/L-ACNC: 1.34 UIU/ML (ref 0.47–4.68)
WBC # BLD AUTO: 4.7 THOUSAND/UL (ref 4.5–11)

## 2020-10-30 PROCEDURE — 84480 ASSAY TRIIODOTHYRONINE (T3): CPT

## 2020-10-30 PROCEDURE — 84443 ASSAY THYROID STIM HORMONE: CPT

## 2020-10-30 PROCEDURE — 80053 COMPREHEN METABOLIC PANEL: CPT

## 2020-10-30 PROCEDURE — 36415 COLL VENOUS BLD VENIPUNCTURE: CPT

## 2020-10-30 PROCEDURE — 84439 ASSAY OF FREE THYROXINE: CPT

## 2020-10-30 PROCEDURE — 85025 COMPLETE CBC W/AUTO DIFF WBC: CPT

## 2020-11-02 RX ORDER — ATORVASTATIN CALCIUM 40 MG/1
40 TABLET, FILM COATED ORAL DAILY
Qty: 30 TABLET | Refills: 0 | Status: SHIPPED | OUTPATIENT
Start: 2020-11-02 | End: 2021-10-01 | Stop reason: SDUPTHER

## 2020-12-08 ENCOUNTER — OFFICE VISIT (OUTPATIENT)
Dept: FAMILY MEDICINE CLINIC | Facility: CLINIC | Age: 60
End: 2020-12-08

## 2020-12-08 VITALS
BODY MASS INDEX: 27.59 KG/M2 | DIASTOLIC BLOOD PRESSURE: 80 MMHG | RESPIRATION RATE: 18 BRPM | HEART RATE: 69 BPM | WEIGHT: 146 LBS | TEMPERATURE: 97.4 F | SYSTOLIC BLOOD PRESSURE: 122 MMHG | OXYGEN SATURATION: 98 %

## 2020-12-08 DIAGNOSIS — Z11.59 ENCOUNTER FOR HEPATITIS C SCREENING TEST FOR LOW RISK PATIENT: ICD-10-CM

## 2020-12-08 DIAGNOSIS — Z11.4 ENCOUNTER FOR SCREENING FOR HIV: ICD-10-CM

## 2020-12-08 DIAGNOSIS — I83.93 VARICOSE VEINS OF BOTH LOWER EXTREMITIES, UNSPECIFIED WHETHER COMPLICATED: ICD-10-CM

## 2020-12-08 DIAGNOSIS — Z23 FLU VACCINE NEED: ICD-10-CM

## 2020-12-08 DIAGNOSIS — E05.00 GRAVES DISEASE: Primary | ICD-10-CM

## 2020-12-08 DIAGNOSIS — E78.2 MIXED HYPERLIPIDEMIA: ICD-10-CM

## 2020-12-08 DIAGNOSIS — Z12.31 ENCOUNTER FOR SCREENING MAMMOGRAM FOR MALIGNANT NEOPLASM OF BREAST: ICD-10-CM

## 2020-12-08 DIAGNOSIS — I10 ESSENTIAL HYPERTENSION: ICD-10-CM

## 2020-12-08 PROCEDURE — 3079F DIAST BP 80-89 MM HG: CPT | Performed by: FAMILY MEDICINE

## 2020-12-08 PROCEDURE — 1036F TOBACCO NON-USER: CPT | Performed by: FAMILY MEDICINE

## 2020-12-08 PROCEDURE — 3074F SYST BP LT 130 MM HG: CPT | Performed by: FAMILY MEDICINE

## 2020-12-08 PROCEDURE — 3725F SCREEN DEPRESSION PERFORMED: CPT | Performed by: FAMILY MEDICINE

## 2020-12-08 PROCEDURE — 99214 OFFICE O/P EST MOD 30 MIN: CPT | Performed by: FAMILY MEDICINE

## 2020-12-23 ENCOUNTER — LAB (OUTPATIENT)
Dept: LAB | Facility: HOSPITAL | Age: 60
End: 2020-12-23
Payer: COMMERCIAL

## 2020-12-23 DIAGNOSIS — Z11.4 ENCOUNTER FOR SCREENING FOR HIV: ICD-10-CM

## 2020-12-23 DIAGNOSIS — E78.2 MIXED HYPERLIPIDEMIA: ICD-10-CM

## 2020-12-23 DIAGNOSIS — Z11.59 ENCOUNTER FOR HEPATITIS C SCREENING TEST FOR LOW RISK PATIENT: ICD-10-CM

## 2020-12-23 LAB
ALT SERPL W P-5'-P-CCNC: 15 U/L (ref 9–52)
CHOLEST SERPL-MCNC: 118 MG/DL
HCV AB SER QL: NORMAL
HDLC SERPL-MCNC: 31 MG/DL
LDLC SERPL CALC-MCNC: 71 MG/DL
TRIGL SERPL-MCNC: 79 MG/DL

## 2020-12-23 PROCEDURE — 84460 ALANINE AMINO (ALT) (SGPT): CPT

## 2020-12-23 PROCEDURE — 86803 HEPATITIS C AB TEST: CPT

## 2020-12-23 PROCEDURE — 87389 HIV-1 AG W/HIV-1&-2 AB AG IA: CPT

## 2020-12-23 PROCEDURE — 80061 LIPID PANEL: CPT

## 2020-12-23 PROCEDURE — 36415 COLL VENOUS BLD VENIPUNCTURE: CPT

## 2020-12-24 ENCOUNTER — TELEPHONE (OUTPATIENT)
Dept: FAMILY MEDICINE CLINIC | Facility: CLINIC | Age: 60
End: 2020-12-24

## 2020-12-24 LAB — HIV 1+2 AB+HIV1 P24 AG SERPL QL IA: NORMAL

## 2021-01-04 ENCOUNTER — LAB (OUTPATIENT)
Dept: LAB | Facility: HOSPITAL | Age: 61
End: 2021-01-04
Payer: COMMERCIAL

## 2021-01-04 ENCOUNTER — TRANSCRIBE ORDERS (OUTPATIENT)
Dept: LAB | Facility: HOSPITAL | Age: 61
End: 2021-01-04

## 2021-01-04 DIAGNOSIS — E78.2 MIXED HYPERLIPIDEMIA: Primary | ICD-10-CM

## 2021-01-04 DIAGNOSIS — E05.90 THYROTOXICOSIS WITHOUT THYROID STORM, UNSPECIFIED THYROTOXICOSIS TYPE: ICD-10-CM

## 2021-01-04 LAB
ALBUMIN SERPL BCP-MCNC: 4.3 G/DL (ref 3–5.2)
ALP SERPL-CCNC: 58 U/L (ref 43–122)
ALT SERPL W P-5'-P-CCNC: 21 U/L (ref 9–52)
ANION GAP SERPL CALCULATED.3IONS-SCNC: 5 MMOL/L (ref 5–14)
AST SERPL W P-5'-P-CCNC: 30 U/L (ref 14–36)
BASOPHILS # BLD AUTO: 0.1 THOUSANDS/ΜL (ref 0–0.1)
BASOPHILS NFR BLD AUTO: 1 % (ref 0–1)
BILIRUB SERPL-MCNC: 1.1 MG/DL
BUN SERPL-MCNC: 9 MG/DL (ref 5–25)
CALCIUM SERPL-MCNC: 9.3 MG/DL (ref 8.4–10.2)
CHLORIDE SERPL-SCNC: 103 MMOL/L (ref 97–108)
CHOLEST SERPL-MCNC: 132 MG/DL
CO2 SERPL-SCNC: 35 MMOL/L (ref 22–30)
CREAT SERPL-MCNC: 0.6 MG/DL (ref 0.6–1.2)
EOSINOPHIL # BLD AUTO: 0.1 THOUSAND/ΜL (ref 0–0.4)
EOSINOPHIL NFR BLD AUTO: 2 % (ref 0–6)
ERYTHROCYTE [DISTWIDTH] IN BLOOD BY AUTOMATED COUNT: 14.1 %
GFR SERPL CREATININE-BSD FRML MDRD: 115 ML/MIN/1.73SQ M
GLUCOSE P FAST SERPL-MCNC: 84 MG/DL (ref 70–99)
HCT VFR BLD AUTO: 39.3 % (ref 36–46)
HDLC SERPL-MCNC: 37 MG/DL
HGB BLD-MCNC: 12.6 G/DL (ref 12–16)
LDLC SERPL CALC-MCNC: 79 MG/DL
LYMPHOCYTES # BLD AUTO: 2.5 THOUSANDS/ΜL (ref 0.5–4)
LYMPHOCYTES NFR BLD AUTO: 40 % (ref 25–45)
MCH RBC QN AUTO: 26.4 PG (ref 26–34)
MCHC RBC AUTO-ENTMCNC: 32 G/DL (ref 31–36)
MCV RBC AUTO: 83 FL (ref 80–100)
MONOCYTES # BLD AUTO: 0.5 THOUSAND/ΜL (ref 0.2–0.9)
MONOCYTES NFR BLD AUTO: 9 % (ref 1–10)
NEUTROPHILS # BLD AUTO: 2.9 THOUSANDS/ΜL (ref 1.8–7.8)
NEUTS SEG NFR BLD AUTO: 48 % (ref 45–65)
NONHDLC SERPL-MCNC: 95 MG/DL
PLATELET # BLD AUTO: 239 THOUSANDS/UL (ref 150–450)
PMV BLD AUTO: 9.2 FL (ref 8.9–12.7)
POTASSIUM SERPL-SCNC: 4.1 MMOL/L (ref 3.6–5)
PROT SERPL-MCNC: 8 G/DL (ref 5.9–8.4)
RBC # BLD AUTO: 4.75 MILLION/UL (ref 4–5.2)
SODIUM SERPL-SCNC: 143 MMOL/L (ref 137–147)
TRIGL SERPL-MCNC: 78 MG/DL
TSH SERPL DL<=0.05 MIU/L-ACNC: 1.16 UIU/ML (ref 0.47–4.68)
WBC # BLD AUTO: 6.1 THOUSAND/UL (ref 4.5–11)

## 2021-01-04 PROCEDURE — 36415 COLL VENOUS BLD VENIPUNCTURE: CPT

## 2021-01-04 PROCEDURE — 80053 COMPREHEN METABOLIC PANEL: CPT

## 2021-01-04 PROCEDURE — 85025 COMPLETE CBC W/AUTO DIFF WBC: CPT

## 2021-01-04 PROCEDURE — 84443 ASSAY THYROID STIM HORMONE: CPT

## 2021-01-04 PROCEDURE — 80061 LIPID PANEL: CPT

## 2021-01-13 ENCOUNTER — OFFICE VISIT (OUTPATIENT)
Dept: VASCULAR SURGERY | Facility: CLINIC | Age: 61
End: 2021-01-13
Payer: COMMERCIAL

## 2021-01-13 VITALS
BODY MASS INDEX: 26.31 KG/M2 | HEIGHT: 62 IN | RESPIRATION RATE: 18 BRPM | SYSTOLIC BLOOD PRESSURE: 132 MMHG | WEIGHT: 143 LBS | HEART RATE: 73 BPM | DIASTOLIC BLOOD PRESSURE: 84 MMHG

## 2021-01-13 DIAGNOSIS — E78.2 MIXED HYPERLIPIDEMIA: ICD-10-CM

## 2021-01-13 DIAGNOSIS — E05.00 GRAVES DISEASE: ICD-10-CM

## 2021-01-13 DIAGNOSIS — I83.813 VARICOSE VEINS OF BOTH LOWER EXTREMITIES WITH PAIN: Primary | ICD-10-CM

## 2021-01-13 DIAGNOSIS — I10 ESSENTIAL HYPERTENSION: ICD-10-CM

## 2021-01-13 PROCEDURE — 3008F BODY MASS INDEX DOCD: CPT | Performed by: PHYSICIAN ASSISTANT

## 2021-01-13 PROCEDURE — 3075F SYST BP GE 130 - 139MM HG: CPT | Performed by: PHYSICIAN ASSISTANT

## 2021-01-13 PROCEDURE — 1036F TOBACCO NON-USER: CPT | Performed by: PHYSICIAN ASSISTANT

## 2021-01-13 PROCEDURE — 3008F BODY MASS INDEX DOCD: CPT | Performed by: FAMILY MEDICINE

## 2021-01-13 PROCEDURE — 3079F DIAST BP 80-89 MM HG: CPT | Performed by: PHYSICIAN ASSISTANT

## 2021-01-13 PROCEDURE — 99244 OFF/OP CNSLTJ NEW/EST MOD 40: CPT | Performed by: PHYSICIAN ASSISTANT

## 2021-01-13 NOTE — ASSESSMENT & PLAN NOTE
28-year-old female with HTN, HLD, Graves disease and longstanding symptomatic BLE reticular varicosity, R>L  Patient does not wear compression    -recommend 3 month trial of conservative measures to include daily use of compression stockings, lower extremity elevation, low-sodium diet, aerobic activity, weight management and skin moisturization  -LEVDR in 3 months  -return to office with surgeon in 3 months with LEVDR for re-evaluation and discussion of surgical options  -instructed to contact the office in the interim with any questions, concerns or new symptoms

## 2021-01-13 NOTE — PROGRESS NOTES
Assessment/Plan:    Varicose veins of both lower extremities with pain  81-year-old female with HTN, HLD, Graves disease and longstanding symptomatic BLE reticular varicosity, R>L  Patient does not wear compression  -recommend 3 month trial of conservative measures to include daily use of compression stockings, lower extremity elevation, low-sodium diet, aerobic activity, weight management and skin moisturization  -LEVDR in 3 months  -return to office with surgeon in 3 months with LEVDR for re-evaluation and discussion of surgical options  -instructed to contact the office in the interim with any questions, concerns or new symptoms    Essential hypertension  -BP well controlled  -continue current medical regimen  -management per PCP    Hyperlipidemia  -stable  -continue statin therapy  -management per PCP    Graves disease  -stable  -continue methimazole  -follow-up and management per primary medical service       Diagnoses and all orders for this visit:    Varicose veins of both lower extremities with pain  -     Compression Stocking  -     VAS reflux lower limb venous duplex study with reflux assessment, complete bilateral; Future  -     Ambulatory referral to Vascular Surgery    Essential hypertension    Mixed hyperlipidemia    Graves disease          Subjective:      Patient ID: Prudencio Oviedo is a 62 y o  female  Patient is new to our practice and was referred by Dr Mustapha Singh for VV  Pt c/o painful bulging veins that started over 10 years ago and has gotten progressively worse  Pt states the Rt leg > Lt leg  Pt does not wear compression stocking or elevate her legs  Pt denies bleeding veins or DVT  81-year-old female with HTN, HLD, Graves disease and longstanding symptomatic BLE reticular varicosity, R>L, referred by her PCP for evaluation of symptomatic varicose veins    Pt c/o bilateral lower extremity heaviness, aching, pruritus of feet, burning and edema exacerbated by standing for prolonged periods of time   Symptoms worse on the right lower extremity  Patient denies hx of DVT, PE, hypercoagulable disorder, superficial thrombophlebitis, venous ulceration, bleeding varicosities, recurrent lower extremity cellulitis or stasis dermatitis   Patient denies family history of varicose veins, VTE or hypercoagulable disorder   Patient denies prior venous intervention or evaluation by vascular surgeon  Patient does not wear compression  The following portions of the patient's history were reviewed and updated as appropriate: allergies, current medications, past family history, past medical history, past social history, past surgical history and problem list     Review of Systems   Constitutional: Negative  HENT: Negative  Eyes: Negative  Respiratory: Negative  Cardiovascular:        Painful veins   Gastrointestinal: Negative  Endocrine: Negative  Genitourinary: Negative  Musculoskeletal: Negative  Skin: Negative  Allergic/Immunologic: Negative  Neurological: Negative  Hematological: Negative  Psychiatric/Behavioral: Negative  I have reviewed and made appropriate changes to the review of systems input by the medical assistant  Vitals:    01/13/21 1506   BP: 132/84   BP Location: Left arm   Patient Position: Sitting   Pulse: 73   Resp: 18   Weight: 64 9 kg (143 lb)   Height: 5' 2" (1 575 m)       Patient Active Problem List   Diagnosis    Graves disease    Essential hypertension    Encounter for gynecological examination    Screening for colon cancer    Varicose veins of both lower extremities with pain    Hyperlipidemia       History reviewed  No pertinent surgical history  History reviewed  No pertinent family history      Social History     Socioeconomic History    Marital status: /Civil Union     Spouse name: Not on file    Number of children: Not on file    Years of education: Not on file    Highest education level: Not on file   Occupational History    Not on file   Social Needs    Financial resource strain: Not on file    Food insecurity     Worry: Not on file     Inability: Not on file    Transportation needs     Medical: Not on file     Non-medical: Not on file   Tobacco Use    Smoking status: Never Smoker    Smokeless tobacco: Never Used   Substance and Sexual Activity    Alcohol use: No    Drug use: No    Sexual activity: Not on file   Lifestyle    Physical activity     Days per week: Not on file     Minutes per session: Not on file    Stress: Not on file   Relationships    Social connections     Talks on phone: Not on file     Gets together: Not on file     Attends Jewish service: Not on file     Active member of club or organization: Not on file     Attends meetings of clubs or organizations: Not on file     Relationship status: Not on file    Intimate partner violence     Fear of current or ex partner: Not on file     Emotionally abused: Not on file     Physically abused: Not on file     Forced sexual activity: Not on file   Other Topics Concern    Not on file   Social History Narrative    No caffeine use       Allergies   Allergen Reactions    No Active Allergies          Current Outpatient Medications:     ARTIFICIAL TEARS 1 4 % ophthalmic solution, , Disp: , Rfl:     atenolol (TENORMIN) 50 mg tablet, Take 1 tablet (50 mg total) by mouth daily, Disp: 30 tablet, Rfl: 0    atorvastatin (LIPITOR) 40 mg tablet, Take 1 tablet (40 mg total) by mouth daily, Disp: 30 tablet, Rfl: 0    Blood Pressure Monitoring (BLOOD PRESSURE CUFF) MISC, by Does not apply route daily, Disp: 1 each, Rfl: 0    [employer] ALLERGY RELIEF 10 MG tablet, TAKE 1 TABLET BY MOUTH DAILY, Disp: , Rfl: 3    ketotifen (ZADITOR) 0 025 % ophthalmic solution, , Disp: , Rfl:     methimazole (TAPAZOLE) 5 mg tablet, Take 1 tablet (5 mg total) by mouth daily, Disp: 30 tablet, Rfl: 0    NATURAL BALANCE TEARS 0 1-0 3 % ophthalmic solution, , Disp: , Rfl:     olopatadine (PATANOL) 0 1 % ophthalmic solution, Apply 1 drop to eye, Disp: , Rfl:     Objective:  Imaging study:  No vascular imaging studies for review    /84 (BP Location: Left arm, Patient Position: Sitting)   Pulse 73   Resp 18   Ht 5' 2" (1 575 m)   Wt 64 9 kg (143 lb)   BMI 26 16 kg/m²          Physical Exam  Vitals signs and nursing note reviewed  Constitutional:       General: She is not in acute distress  Appearance: Normal appearance  She is well-developed  HENT:      Head: Normocephalic and atraumatic  Eyes:      General: No scleral icterus  Conjunctiva/sclera: Conjunctivae normal       Pupils: Pupils are equal, round, and reactive to light  Neck:      Musculoskeletal: Normal range of motion and neck supple  Vascular: No carotid bruit or JVD  Trachea: No tracheal deviation  Cardiovascular:      Rate and Rhythm: Normal rate and regular rhythm  Pulses:           Carotid pulses are 2+ on the right side and 2+ on the left side  Radial pulses are 2+ on the right side and 2+ on the left side  Dorsalis pedis pulses are 2+ on the right side and 2+ on the left side  Heart sounds: S1 normal and S2 normal  No murmur  No friction rub  No gallop  No S3 sounds  Comments: Bilateral lower extremities warm, pink, motor and sensory intact well perfused without cyanosis, pallor, rubor, lipodermatosclerosis or venous ulcerations  Pinpoint hemosiderin staining noted bilateral lower legs  Reticular varicosities bilateral anterior shin and posterior calf  Pulmonary:      Effort: No respiratory distress  Breath sounds: Normal breath sounds  No stridor  No wheezing, rhonchi or rales  Abdominal:      General: Bowel sounds are normal  There is no distension or abdominal bruit  Palpations: Abdomen is soft  There is no mass or pulsatile mass  Tenderness: There is no abdominal tenderness  There is no rebound  Musculoskeletal: Normal range of motion  General: No deformity  Right lower leg: No edema  Left lower leg: No edema  Skin:     General: Skin is warm and dry  Coloration: Skin is not pale  Findings: No erythema or lesion  Neurological:      Mental Status: She is alert and oriented to person, place, and time  Psychiatric:         Mood and Affect: Mood normal          Thought Content:  Thought content normal

## 2021-01-13 NOTE — PATIENT INSTRUCTIONS
Varicose Veins   WHAT YOU NEED TO KNOW:   What are varicose veins? Varicose veins are veins that become large, twisted, and swollen  They are common on the back of the calves, knees, and thighs  Varicose veins are caused by valves in your veins that do not work properly  This causes blood to collect and increase pressure in the veins of your legs  The increased pressure causes your veins to stretch, get larger, swell, and twist        What increases my risk for varicose veins? · Pregnancy    · A family history of varicose veins    · Being overweight or obese    · Age 48 years or older    · Sitting or standing for long periods of time    · Wearing tight clothing  What are the signs and symptoms of varicose veins? Your symptoms may be worse after you stand or sit for long periods of time  You may have any of the following:  · Blue, purple, or bulging veins in your legs     · Pain, swelling, or muscle cramps in your legs    · Feeling of fatigue or heaviness in your legs  How are varicose veins diagnosed? Your healthcare provider will examine your legs and ask about your medical history  You may need tests, such as a Doppler ultrasound or duplex scan  These tests show your veins and valves, and how your blood is flowing through them  These tests may also show if there is a blockage or blood clot  How are varicose veins treated? The goal of treatment is to decrease symptoms, improve appearance, and prevent further problems  Treatment will depend on which veins are affected and how severe your condition is  You may need procedures to treat or remove your varicose veins  For example, your healthcare provider may inject a solution or use a laser to close the varicose veins  Surgery to remove long veins may also be done  Ask your healthcare provider for more information about procedures used to treat varicose veins  What can I do to manage my symptoms? · Do not sit or stand for long periods of time    This can cause the blood to collect in your legs and make your symptoms worse  Bend or rotate your ankles several times every hour  Walk around for a few minutes every hour to get blood moving in your legs  · Do not cross your legs when you sit  This decreases blood flow to your feet and can make your symptoms worse  · Do not wear tight clothing or shoes  Do not wear high-heeled shoes  Do not wear clothes that are tight around the waist or knees  · Maintain a healthy weight  Being overweight or obese can make your varicose veins worse  Ask your healthcare provider how much you should weigh  Ask him or her to help you create a weight loss plan if you are overweight  · Wear pressure stockings as directed  The stockings are tight and put pressure on your legs  They improve blood flow and help prevent clots  · Elevate your legs  Keep them above the level of your heart for 15 to 30 minutes several times a day  You can also prop the end of your bed up slightly to elevate your legs while you sleep  This will help blood to flow back to your heart  · Get regular exercise  Talk to your healthcare provider about the best exercise plan for you  Exercise can improve blood flow to your legs and feet  When should I seek immediate care? · You have a wound that does not heal or is infected  · You have an injury that has broken your skin and caused your varicose veins to bleed  · Your leg is swollen and hard  · You notice that your legs or feet are turning blue or black  · Your leg feels warm, tender, and painful  It may look swollen and red  When should I contact my healthcare provider? · You have pain in your leg that does not go away or gets worse  · You notice sudden large bruising on your legs  · You have a rash on your leg  · Your symptoms keep you from doing your daily activities  · You have questions or concerns about your condition or care    CARE AGREEMENT:   You have the right to help plan your care  Learn about your health condition and how it may be treated  Discuss treatment options with your caregivers to decide what care you want to receive  You always have the right to refuse treatment  The above information is an  only  It is not intended as medical advice for individual conditions or treatments  Talk to your doctor, nurse or pharmacist before following any medical regimen to see if it is safe and effective for you  © 2017 [employer]  Information is for End User's use only and may not be sold, redistributed or otherwise used for commercial purposes  All illustrations and images included in CareNotes® are the copyrighted property of [employer]  or [employer]      -recommend 3 month trial of conservative measures to include daily use of prescription compression stockings, leg elevation, low-salt diet, aerobic activity, weight management and lotion to leg to help promote good skin health  -place your compression stockings on in the morning and remove prior to bed  -we will schedule you for a lower extremity venous reflux study to assess the function of your valves in your veins to help guide treatment options  -return to office with surgeon in 3 months after reflux study for re-evaluation   -please contact the office in the interim with any questions, concerns or new symptoms

## 2021-03-19 ENCOUNTER — HOSPITAL ENCOUNTER (OUTPATIENT)
Dept: MAMMOGRAPHY | Facility: CLINIC | Age: 61
Discharge: HOME | End: 2021-03-19
Payer: COMMERCIAL

## 2021-03-19 VITALS — BODY MASS INDEX: 26.31 KG/M2 | WEIGHT: 143 LBS | HEIGHT: 62 IN

## 2021-03-19 DIAGNOSIS — Z12.31 ENCOUNTER FOR SCREENING MAMMOGRAM FOR MALIGNANT NEOPLASM OF BREAST: ICD-10-CM

## 2021-03-19 PROCEDURE — 77067 SCR MAMMO BI INCL CAD: CPT

## 2021-03-19 PROCEDURE — 77063 BREAST TOMOSYNTHESIS BI: CPT

## 2021-04-13 ENCOUNTER — IMMUNIZATIONS (OUTPATIENT)
Dept: FAMILY MEDICINE CLINIC | Facility: HOSPITAL | Age: 61
End: 2021-04-13

## 2021-04-13 DIAGNOSIS — Z23 ENCOUNTER FOR IMMUNIZATION: Primary | ICD-10-CM

## 2021-04-13 PROCEDURE — 91301 SARS-COV-2 / COVID-19 MRNA VACCINE (MODERNA) 100 MCG: CPT

## 2021-04-13 PROCEDURE — 0011A SARS-COV-2 / COVID-19 MRNA VACCINE (MODERNA) 100 MCG: CPT

## 2021-04-16 ENCOUNTER — HOSPITAL ENCOUNTER (OUTPATIENT)
Dept: NON INVASIVE DIAGNOSTICS | Facility: CLINIC | Age: 61
Discharge: HOME | End: 2021-04-16
Payer: COMMERCIAL

## 2021-04-16 DIAGNOSIS — I83.813 VARICOSE VEINS OF BOTH LOWER EXTREMITIES WITH PAIN: ICD-10-CM

## 2021-04-16 PROCEDURE — 93970 EXTREMITY STUDY: CPT

## 2021-04-17 PROCEDURE — 93970 EXTREMITY STUDY: CPT | Performed by: SURGERY

## 2021-04-28 ENCOUNTER — OFFICE VISIT (OUTPATIENT)
Dept: VASCULAR SURGERY | Facility: CLINIC | Age: 61
End: 2021-04-28
Payer: COMMERCIAL

## 2021-04-28 VITALS
HEIGHT: 62 IN | HEART RATE: 67 BPM | DIASTOLIC BLOOD PRESSURE: 80 MMHG | RESPIRATION RATE: 18 BRPM | BODY MASS INDEX: 26.68 KG/M2 | WEIGHT: 145 LBS | SYSTOLIC BLOOD PRESSURE: 142 MMHG

## 2021-04-28 DIAGNOSIS — I83.813 VARICOSE VEINS OF BOTH LOWER EXTREMITIES WITH PAIN: Primary | ICD-10-CM

## 2021-04-28 PROCEDURE — 3077F SYST BP >= 140 MM HG: CPT | Performed by: SURGERY

## 2021-04-28 PROCEDURE — 99214 OFFICE O/P EST MOD 30 MIN: CPT | Performed by: SURGERY

## 2021-04-28 PROCEDURE — 3008F BODY MASS INDEX DOCD: CPT | Performed by: SURGERY

## 2021-04-28 PROCEDURE — 3079F DIAST BP 80-89 MM HG: CPT | Performed by: SURGERY

## 2021-04-28 NOTE — LETTER
April 28, 2021     [place], [address]    Patient: Jerene Hatchet   YOB: 1960   Date of Visit: 4/28/2021       Dear Dr Alaina Chambers:    Thank you for referring Charo Rivera to me for evaluation  Below are the relevant portions of my assessment and plan of care  Varicose veins of both lower extremities with pain  62year old female with right lower extremity venous insufficiency with significant improvement using compression stockings  -patient wishes to continue with compression stockings as symptoms have significantly improved  -she will follow up with the vascular center as needed  -should her symptoms begin to worsen patient will follow up in office for re-evaluation     If you have questions, please do not hesitate to call me  I look forward to following Corina Bright along with you           Sincerely,        Andre Jenkins DO        CC: No Recipients

## 2021-04-28 NOTE — ASSESSMENT & PLAN NOTE
62year old female with right lower extremity venous insufficiency with significant improvement using compression stockings  -patient wishes to continue with compression stockings as symptoms have significantly improved  -she will follow up with the vascular center as needed  -should her symptoms begin to worsen patient will follow up in office for re-evaluation

## 2021-04-28 NOTE — PROGRESS NOTES
Assessment/Plan:    Varicose veins of both lower extremities with pain  62year old female with right lower extremity venous insufficiency with significant improvement using compression stockings  -patient wishes to continue with compression stockings as symptoms have significantly improved  -she will follow up with the vascular center as needed  -should her symptoms begin to worsen patient will follow up in office for re-evaluation        Diagnoses and all orders for this visit:    Varicose veins of both lower extremities with pain         Subjective:      Patient ID: Nikki Lincoln is a 62 y o  female  62year old female presents for review of results of recent venous reflux study  She was seen in the office several months ago and has been using compression stockings since  She states that her swelling and throbbing/discomfort have improved significantly since using compression  The following portions of the patient's history were reviewed and updated as appropriate: allergies, current medications, past family history, past medical history, past social history, past surgical history and problem list     Review of Systems   Constitutional: Negative  HENT: Negative  Eyes: Negative  Respiratory: Negative  Cardiovascular: Positive for leg swelling  Painful veins   Gastrointestinal: Negative  Endocrine: Negative  Genitourinary: Negative  Musculoskeletal: Negative  Skin: Negative  Allergic/Immunologic: Negative  Neurological: Negative  Hematological: Negative  Psychiatric/Behavioral: Negative  Objective:      /80 (BP Location: Right arm, Patient Position: Sitting)   Pulse 67   Resp 18   Ht 5' 2" (1 575 m)   Wt 65 8 kg (145 lb)   BMI 26 52 kg/m²          Physical Exam  Constitutional:       General: She is not in acute distress  Appearance: Normal appearance  She is normal weight  She is not ill-appearing     HENT:      Nose: Nose normal    Neck: Vascular: No carotid bruit  Cardiovascular:      Rate and Rhythm: Normal rate and regular rhythm  Pulses: Normal pulses  Pulmonary:      Effort: Pulmonary effort is normal       Breath sounds: No wheezing or rhonchi  Abdominal:      Palpations: Abdomen is soft  Tenderness: There is no abdominal tenderness  Musculoskeletal:         General: No swelling  Right lower leg: No edema  Left lower leg: No edema  Skin:     General: Skin is warm and dry  Capillary Refill: Capillary refill takes less than 2 seconds  Neurological:      General: No focal deficit present  Mental Status: She is alert and oriented to person, place, and time

## 2021-05-14 ENCOUNTER — IMMUNIZATIONS (OUTPATIENT)
Dept: FAMILY MEDICINE CLINIC | Facility: HOSPITAL | Age: 61
End: 2021-05-14

## 2021-05-14 DIAGNOSIS — Z23 ENCOUNTER FOR IMMUNIZATION: Primary | ICD-10-CM

## 2021-05-14 PROCEDURE — 0012A SARS-COV-2 / COVID-19 MRNA VACCINE (MODERNA) 100 MCG: CPT

## 2021-05-14 PROCEDURE — 91301 SARS-COV-2 / COVID-19 MRNA VACCINE (MODERNA) 100 MCG: CPT

## 2021-09-03 ENCOUNTER — APPOINTMENT (OUTPATIENT)
Dept: LAB | Facility: HOSPITAL | Age: 61
End: 2021-09-03
Payer: COMMERCIAL

## 2021-09-03 DIAGNOSIS — E05.00 GRAVES' DISEASE: ICD-10-CM

## 2021-09-03 LAB — TSH SERPL DL<=0.05 MIU/L-ACNC: <0.015 UIU/ML (ref 0.47–4.68)

## 2021-09-03 PROCEDURE — 84439 ASSAY OF FREE THYROXINE: CPT

## 2021-09-03 PROCEDURE — 36415 COLL VENOUS BLD VENIPUNCTURE: CPT

## 2021-09-03 PROCEDURE — 84443 ASSAY THYROID STIM HORMONE: CPT

## 2021-09-04 LAB — T4 FREE SERPL-MCNC: 1.65 NG/DL (ref 0.76–1.46)

## 2021-10-01 ENCOUNTER — OFFICE VISIT (OUTPATIENT)
Dept: FAMILY MEDICINE CLINIC | Facility: CLINIC | Age: 61
End: 2021-10-01

## 2021-10-01 VITALS
DIASTOLIC BLOOD PRESSURE: 80 MMHG | WEIGHT: 145 LBS | RESPIRATION RATE: 16 BRPM | OXYGEN SATURATION: 97 % | HEART RATE: 113 BPM | TEMPERATURE: 98 F | BODY MASS INDEX: 26.68 KG/M2 | SYSTOLIC BLOOD PRESSURE: 150 MMHG | HEIGHT: 62 IN

## 2021-10-01 DIAGNOSIS — I10 ESSENTIAL HYPERTENSION: ICD-10-CM

## 2021-10-01 DIAGNOSIS — E05.00 GRAVES DISEASE: Primary | ICD-10-CM

## 2021-10-01 DIAGNOSIS — Z23 NEED FOR VACCINATION: ICD-10-CM

## 2021-10-01 DIAGNOSIS — I83.813 VARICOSE VEINS OF BOTH LOWER EXTREMITIES WITH PAIN: ICD-10-CM

## 2021-10-01 DIAGNOSIS — E78.2 MIXED HYPERLIPIDEMIA: ICD-10-CM

## 2021-10-01 PROCEDURE — 90682 RIV4 VACC RECOMBINANT DNA IM: CPT | Performed by: FAMILY MEDICINE

## 2021-10-01 PROCEDURE — 99213 OFFICE O/P EST LOW 20 MIN: CPT | Performed by: FAMILY MEDICINE

## 2021-10-01 PROCEDURE — 90471 IMMUNIZATION ADMIN: CPT | Performed by: FAMILY MEDICINE

## 2021-10-01 RX ORDER — ATORVASTATIN CALCIUM 40 MG/1
40 TABLET, FILM COATED ORAL DAILY
Qty: 30 TABLET | Refills: 2 | Status: SHIPPED | OUTPATIENT
Start: 2021-10-01 | End: 2022-02-04 | Stop reason: SDUPTHER

## 2021-10-01 RX ORDER — ATENOLOL 50 MG/1
50 TABLET ORAL DAILY
Qty: 90 TABLET | Refills: 2 | Status: SHIPPED | OUTPATIENT
Start: 2021-10-01 | End: 2021-10-01

## 2021-10-01 RX ORDER — OLOPATADINE HYDROCHLORIDE 1 MG/ML
1 SOLUTION/ DROPS OPHTHALMIC 2 TIMES DAILY
Qty: 5 ML | Refills: 2 | Status: SHIPPED | OUTPATIENT
Start: 2021-10-01 | End: 2021-10-01

## 2021-10-01 RX ORDER — ATENOLOL 50 MG/1
50 TABLET ORAL DAILY
Qty: 90 TABLET | Refills: 2 | Status: SHIPPED | OUTPATIENT
Start: 2021-10-01 | End: 2022-02-04 | Stop reason: SDUPTHER

## 2021-10-01 RX ORDER — OLOPATADINE HYDROCHLORIDE 1 MG/ML
1 SOLUTION/ DROPS OPHTHALMIC 2 TIMES DAILY
Qty: 5 ML | Refills: 2 | Status: SHIPPED | OUTPATIENT
Start: 2021-10-01 | End: 2022-02-04 | Stop reason: SDUPTHER

## 2021-10-01 RX ORDER — FERROUS SULFATE 325(65) MG
325 TABLET ORAL
COMMUNITY

## 2021-11-15 ENCOUNTER — VBI (OUTPATIENT)
Dept: ADMINISTRATIVE | Facility: OTHER | Age: 61
End: 2021-11-15

## 2021-12-07 ENCOUNTER — APPOINTMENT (OUTPATIENT)
Dept: LAB | Facility: HOSPITAL | Age: 61
End: 2021-12-07
Payer: COMMERCIAL

## 2021-12-07 DIAGNOSIS — E05.00 THYROTOXICOSIS WITH DIFFUSE GOITER WITHOUT THYROTOXIC CRISIS OR STORM: ICD-10-CM

## 2021-12-07 LAB
ALBUMIN SERPL BCP-MCNC: 4.3 G/DL (ref 3–5.2)
ALP SERPL-CCNC: 91 U/L (ref 43–122)
ALT SERPL W P-5'-P-CCNC: 34 U/L
ANISOCYTOSIS BLD QL SMEAR: PRESENT
AST SERPL W P-5'-P-CCNC: 39 U/L (ref 14–36)
BASOPHILS # BLD AUTO: 0 THOUSANDS/ΜL (ref 0–0.1)
BASOPHILS NFR BLD AUTO: 0 % (ref 0–1)
BILIRUB DIRECT SERPL-MCNC: 0.02 MG/DL
BILIRUB SERPL-MCNC: 0.8 MG/DL
EOSINOPHIL # BLD AUTO: 0.1 THOUSAND/ΜL (ref 0–0.4)
EOSINOPHIL NFR BLD AUTO: 2 % (ref 0–6)
ERYTHROCYTE [DISTWIDTH] IN BLOOD BY AUTOMATED COUNT: 14 %
HCT VFR BLD AUTO: 39.6 % (ref 36–46)
HGB BLD-MCNC: 12.7 G/DL (ref 12–16)
LYMPHOCYTES # BLD AUTO: 2.1 THOUSANDS/ΜL (ref 0.5–4)
LYMPHOCYTES NFR BLD AUTO: 39 % (ref 25–45)
MCH RBC QN AUTO: 25.6 PG (ref 26–34)
MCHC RBC AUTO-ENTMCNC: 32.2 G/DL (ref 31–36)
MCV RBC AUTO: 79 FL (ref 80–100)
MICROCYTES BLD QL AUTO: PRESENT
MONOCYTES # BLD AUTO: 0.6 THOUSAND/ΜL (ref 0.2–0.9)
MONOCYTES NFR BLD AUTO: 10 % (ref 1–10)
NEUTROPHILS # BLD AUTO: 2.6 THOUSANDS/ΜL (ref 1.8–7.8)
NEUTS SEG NFR BLD AUTO: 48 % (ref 45–65)
PLATELET # BLD AUTO: 241 THOUSANDS/UL (ref 150–450)
PLATELET BLD QL SMEAR: ADEQUATE
PMV BLD AUTO: 9.4 FL (ref 8.9–12.7)
PROT SERPL-MCNC: 7.9 G/DL (ref 5.9–8.4)
RBC # BLD AUTO: 4.98 MILLION/UL (ref 4–5.2)
RBC MORPH BLD: NORMAL
T4 FREE SERPL-MCNC: 1.74 NG/DL (ref 0.76–1.46)
T4 SERPL-MCNC: 14.8 UG/DL (ref 4.7–13.3)
TSH SERPL DL<=0.05 MIU/L-ACNC: <0.015 UIU/ML (ref 0.47–4.68)
WBC # BLD AUTO: 5.4 THOUSAND/UL (ref 4.5–11)

## 2021-12-07 PROCEDURE — 84436 ASSAY OF TOTAL THYROXINE: CPT

## 2021-12-07 PROCEDURE — 80076 HEPATIC FUNCTION PANEL: CPT

## 2021-12-07 PROCEDURE — 84480 ASSAY TRIIODOTHYRONINE (T3): CPT

## 2021-12-07 PROCEDURE — 84443 ASSAY THYROID STIM HORMONE: CPT

## 2021-12-07 PROCEDURE — 84439 ASSAY OF FREE THYROXINE: CPT

## 2021-12-07 PROCEDURE — 36415 COLL VENOUS BLD VENIPUNCTURE: CPT

## 2021-12-07 PROCEDURE — 85025 COMPLETE CBC W/AUTO DIFF WBC: CPT

## 2021-12-08 LAB — T3 SERPL-MCNC: 3 NG/ML (ref 0.6–1.8)

## 2022-01-26 NOTE — ASSESSMENT & PLAN NOTE
Today's BP: 120/72  At goal per JNC-8 guidelines  No dizziness, blurred vision, or headache     - Continue Atenolol 50 mg daily   - Microalbumin/creatinine ratio and CMP ordered

## 2022-01-26 NOTE — ASSESSMENT & PLAN NOTE
Methimazole was discontinued early in 2021 (took it from 3329-4398)  However, due to multiple abnormal thyroid labs after discontinuation, was restarted on it in 12/2021  Repeat labs from 1/2022 showed decreased TSH with normal T3 and T4- will continue Methimazole  Patient denies palpitations, anxiety, tremor, sweating, heat intolerance, diarrhea, constipation, dyspnea, orthopnea, leg swelling, dysphagia, voice change, appetite change, skin/hair changes, or insomnia  Weight is stable  PHQ-2 today is 0     - Continue Atenolol 50 mg daily  - Continue Methimazole 5 mg daily  - Continue with Patanol & Zaditor eye drops PRN  - Follow up in 3 months

## 2022-01-26 NOTE — PROGRESS NOTES
Assessment/Plan:    Essential hypertension  Today's BP: 120/72  At goal per JNC-8 guidelines  No dizziness, blurred vision, or headache     - Continue Atenolol 50 mg daily   - Microalbumin/creatinine ratio and CMP ordered  Graves disease  Methimazole was discontinued early in 2021 (took it from 0030-1776)  However, due to multiple abnormal thyroid labs after discontinuation, was restarted on it in 12/2021  Repeat labs from 1/2022 showed decreased TSH with normal T3 and T4- will continue Methimazole  Patient denies palpitations, anxiety, tremor, sweating, heat intolerance, diarrhea, constipation, dyspnea, orthopnea, leg swelling, dysphagia, voice change, appetite change, skin/hair changes, or insomnia  Weight is stable  PHQ-2 today is 0     - Continue Atenolol 50 mg daily  - Continue Methimazole 5 mg daily  - Continue with Patanol & Zaditor eye drops PRN  - Follow up in 3 months  Varicose veins of both lower extremities with pain  Has been using compression stockings with significant symptomatic relief  She says she will continue to use these and follow with vascular surgery as needed  Diagnoses and all orders for this visit:    Screening for colorectal cancer    Essential hypertension  -     Microalbumin / creatinine urine ratio  -     atenolol (TENORMIN) 50 mg tablet; Take 1 tablet (50 mg total) by mouth daily  -     Comprehensive metabolic panel; Future    Graves disease  -     atenolol (TENORMIN) 50 mg tablet; Take 1 tablet (50 mg total) by mouth daily  -     olopatadine (PATANOL) 0 1 % ophthalmic solution; Apply 1 drop to eye 2 (two) times a day    Varicose veins of both lower extremities with pain    Mixed hyperlipidemia  -     atorvastatin (LIPITOR) 40 mg tablet; Take 1 tablet (40 mg total) by mouth daily          Subjective:      Patient ID: Mariah Cook is a 62 y o  female      A very pleasant 63year old female with a PMH of Graves disease (diagnosed in 2018) and hypertension presents to the clinic today for a follow-up on her chronic conditions  Screening mammogram done in March 2021 was normal  She states she did have a prior colonoscopy (does not remember when: 2014 or 2015), which was normal  Patient has no concerns today  The following portions of the patient's history were reviewed and updated as appropriate: allergies, current medications, past family history, past medical history, past social history, past surgical history and problem list     Review of Systems   Constitutional: Negative for activity change, appetite change, chills and fever  HENT: Negative for sore throat  Respiratory: Negative for cough and shortness of breath  Cardiovascular: Negative for chest pain, palpitations and leg swelling  Gastrointestinal: Negative for abdominal pain, constipation, diarrhea, nausea and vomiting  Musculoskeletal: Negative for back pain and gait problem  Neurological: Negative for dizziness, seizures, weakness and headaches  Objective:      /72 (BP Location: Right arm, Patient Position: Sitting, Cuff Size: Standard)   Pulse 84   Temp 98 °F (36 7 °C) (Temporal)   Resp 16   Ht 5' 2" (1 575 m)   Wt 63 5 kg (140 lb 1 6 oz)   SpO2 98%   BMI 25 62 kg/m²          Physical Exam  Vitals reviewed  Constitutional:       General: She is not in acute distress  Appearance: She is well-developed  She is not diaphoretic  Comments: Quiet voice    HENT:      Head: Normocephalic and atraumatic  Eyes:      Conjunctiva/sclera: Conjunctivae normal    Cardiovascular:      Rate and Rhythm: Normal rate and regular rhythm  Heart sounds: Normal heart sounds  No murmur heard  No friction rub  No gallop  Pulmonary:      Effort: Pulmonary effort is normal  No respiratory distress  Breath sounds: Normal breath sounds  No wheezing or rales  Abdominal:      General: Bowel sounds are normal  There is no distension  Palpations: Abdomen is soft   There is no mass       Tenderness: There is no abdominal tenderness  There is no guarding  Musculoskeletal:         General: No tenderness or deformity  Normal range of motion  Cervical back: Normal range of motion  Right lower leg: No edema  Left lower leg: No edema  Skin:     General: Skin is warm and dry  Neurological:      General: No focal deficit present  Mental Status: She is alert and oriented to person, place, and time           Vincent Elizabeth MD  2/4/2022

## 2022-01-31 ENCOUNTER — APPOINTMENT (OUTPATIENT)
Dept: LAB | Facility: HOSPITAL | Age: 62
End: 2022-01-31
Payer: MEDICARE

## 2022-01-31 DIAGNOSIS — E05.00 GRAVES' DISEASE: ICD-10-CM

## 2022-01-31 LAB
ALBUMIN SERPL BCP-MCNC: 4.3 G/DL (ref 3–5.2)
ALP SERPL-CCNC: 66 U/L (ref 43–122)
ALT SERPL W P-5'-P-CCNC: 21 U/L
ANISOCYTOSIS BLD QL SMEAR: PRESENT
AST SERPL W P-5'-P-CCNC: 31 U/L (ref 14–36)
BASOPHILS # BLD AUTO: 0 THOUSANDS/ΜL (ref 0–0.1)
BASOPHILS NFR BLD AUTO: 1 % (ref 0–1)
BILIRUB DIRECT SERPL-MCNC: 0.13 MG/DL
BILIRUB SERPL-MCNC: 0.93 MG/DL
EOSINOPHIL # BLD AUTO: 0.1 THOUSAND/ΜL (ref 0–0.4)
EOSINOPHIL NFR BLD AUTO: 2 % (ref 0–6)
ERYTHROCYTE [DISTWIDTH] IN BLOOD BY AUTOMATED COUNT: 15.1 %
GIANT PLATELETS BLD QL SMEAR: PRESENT
HCT VFR BLD AUTO: 38.5 % (ref 36–46)
HGB BLD-MCNC: 12.8 G/DL (ref 12–16)
LG PLATELETS BLD QL SMEAR: PRESENT
LYMPHOCYTES # BLD AUTO: 2.3 THOUSANDS/ΜL (ref 0.5–4)
LYMPHOCYTES NFR BLD AUTO: 36 % (ref 25–45)
MCH RBC QN AUTO: 26.4 PG (ref 26–34)
MCHC RBC AUTO-ENTMCNC: 33.3 G/DL (ref 31–36)
MCV RBC AUTO: 79 FL (ref 80–100)
MICROCYTES BLD QL AUTO: PRESENT
MONOCYTES # BLD AUTO: 0.5 THOUSAND/ΜL (ref 0.2–0.9)
MONOCYTES NFR BLD AUTO: 8 % (ref 1–10)
NEUTROPHILS # BLD AUTO: 3.4 THOUSANDS/ΜL (ref 1.8–7.8)
NEUTS SEG NFR BLD AUTO: 54 % (ref 45–65)
PLATELET # BLD AUTO: 265 THOUSANDS/UL (ref 150–450)
PLATELET BLD QL SMEAR: ADEQUATE
PMV BLD AUTO: 9.5 FL (ref 8.9–12.7)
PROT SERPL-MCNC: 8.1 G/DL (ref 5.9–8.4)
RBC # BLD AUTO: 4.86 MILLION/UL (ref 4–5.2)
RBC MORPH BLD: NORMAL
T4 FREE SERPL-MCNC: 1 NG/DL (ref 0.76–1.46)
TSH SERPL DL<=0.05 MIU/L-ACNC: <0.015 UIU/ML (ref 0.47–4.68)
WBC # BLD AUTO: 6.3 THOUSAND/UL (ref 4.5–11)

## 2022-01-31 PROCEDURE — 85025 COMPLETE CBC W/AUTO DIFF WBC: CPT

## 2022-01-31 PROCEDURE — 84480 ASSAY TRIIODOTHYRONINE (T3): CPT

## 2022-01-31 PROCEDURE — 36415 COLL VENOUS BLD VENIPUNCTURE: CPT

## 2022-01-31 PROCEDURE — 84439 ASSAY OF FREE THYROXINE: CPT

## 2022-01-31 PROCEDURE — 80076 HEPATIC FUNCTION PANEL: CPT

## 2022-01-31 PROCEDURE — 84443 ASSAY THYROID STIM HORMONE: CPT

## 2022-02-01 LAB — T3 SERPL-MCNC: 1.3 NG/ML (ref 0.6–1.8)

## 2022-02-04 ENCOUNTER — LAB (OUTPATIENT)
Dept: LAB | Facility: CLINIC | Age: 62
End: 2022-02-04
Payer: MEDICARE

## 2022-02-04 ENCOUNTER — OFFICE VISIT (OUTPATIENT)
Dept: FAMILY MEDICINE CLINIC | Facility: CLINIC | Age: 62
End: 2022-02-04

## 2022-02-04 VITALS
WEIGHT: 140.1 LBS | HEART RATE: 84 BPM | OXYGEN SATURATION: 98 % | SYSTOLIC BLOOD PRESSURE: 120 MMHG | TEMPERATURE: 98 F | HEIGHT: 62 IN | BODY MASS INDEX: 25.78 KG/M2 | DIASTOLIC BLOOD PRESSURE: 72 MMHG | RESPIRATION RATE: 16 BRPM

## 2022-02-04 DIAGNOSIS — I10 ESSENTIAL HYPERTENSION: ICD-10-CM

## 2022-02-04 DIAGNOSIS — I83.813 VARICOSE VEINS OF BOTH LOWER EXTREMITIES WITH PAIN: ICD-10-CM

## 2022-02-04 DIAGNOSIS — Z12.12 SCREENING FOR COLORECTAL CANCER: Primary | ICD-10-CM

## 2022-02-04 DIAGNOSIS — Z12.11 SCREENING FOR COLORECTAL CANCER: Primary | ICD-10-CM

## 2022-02-04 DIAGNOSIS — E05.00 GRAVES DISEASE: ICD-10-CM

## 2022-02-04 DIAGNOSIS — E78.2 MIXED HYPERLIPIDEMIA: ICD-10-CM

## 2022-02-04 LAB
ALBUMIN SERPL BCP-MCNC: 3.8 G/DL (ref 3.5–5)
ALP SERPL-CCNC: 79 U/L (ref 46–116)
ALT SERPL W P-5'-P-CCNC: 24 U/L (ref 12–78)
ANION GAP SERPL CALCULATED.3IONS-SCNC: 2 MMOL/L (ref 4–13)
AST SERPL W P-5'-P-CCNC: 22 U/L (ref 5–45)
BILIRUB SERPL-MCNC: 1.98 MG/DL (ref 0.2–1)
BUN SERPL-MCNC: 8 MG/DL (ref 5–25)
CALCIUM SERPL-MCNC: 9.5 MG/DL (ref 8.3–10.1)
CHLORIDE SERPL-SCNC: 106 MMOL/L (ref 100–108)
CO2 SERPL-SCNC: 31 MMOL/L (ref 21–32)
CREAT SERPL-MCNC: 0.58 MG/DL (ref 0.6–1.3)
GFR SERPL CREATININE-BSD FRML MDRD: 100 ML/MIN/1.73SQ M
GLUCOSE P FAST SERPL-MCNC: 78 MG/DL (ref 65–99)
POTASSIUM SERPL-SCNC: 3.9 MMOL/L (ref 3.5–5.3)
PROT SERPL-MCNC: 7.8 G/DL (ref 6.4–8.2)
SODIUM SERPL-SCNC: 139 MMOL/L (ref 136–145)

## 2022-02-04 PROCEDURE — 99213 OFFICE O/P EST LOW 20 MIN: CPT | Performed by: FAMILY MEDICINE

## 2022-02-04 PROCEDURE — 80053 COMPREHEN METABOLIC PANEL: CPT

## 2022-02-04 PROCEDURE — 36415 COLL VENOUS BLD VENIPUNCTURE: CPT

## 2022-02-04 RX ORDER — ATENOLOL 50 MG/1
50 TABLET ORAL DAILY
Qty: 90 TABLET | Refills: 2 | Status: SHIPPED | OUTPATIENT
Start: 2022-02-04

## 2022-02-04 RX ORDER — OLOPATADINE HYDROCHLORIDE 1 MG/ML
1 SOLUTION/ DROPS OPHTHALMIC 2 TIMES DAILY
Qty: 5 ML | Refills: 2 | Status: SHIPPED | OUTPATIENT
Start: 2022-02-04 | End: 2023-02-04

## 2022-02-04 RX ORDER — ATORVASTATIN CALCIUM 40 MG/1
40 TABLET, FILM COATED ORAL DAILY
Qty: 30 TABLET | Refills: 2 | Status: SHIPPED | OUTPATIENT
Start: 2022-02-04

## 2022-02-04 NOTE — ASSESSMENT & PLAN NOTE
Has been using compression stockings with significant symptomatic relief  She says she will continue to use these and follow with vascular surgery as needed

## 2022-02-11 ENCOUNTER — APPOINTMENT (OUTPATIENT)
Dept: LAB | Facility: CLINIC | Age: 62
End: 2022-02-11
Payer: MEDICARE

## 2022-02-11 LAB
CREAT UR-MCNC: 65.5 MG/DL
MICROALBUMIN UR-MCNC: <5 MG/L (ref 0–20)
MICROALBUMIN/CREAT 24H UR: <8 MG/G CREATININE (ref 0–30)

## 2022-02-11 PROCEDURE — 82570 ASSAY OF URINE CREATININE: CPT | Performed by: STUDENT IN AN ORGANIZED HEALTH CARE EDUCATION/TRAINING PROGRAM

## 2022-02-11 PROCEDURE — 82043 UR ALBUMIN QUANTITATIVE: CPT | Performed by: STUDENT IN AN ORGANIZED HEALTH CARE EDUCATION/TRAINING PROGRAM

## 2022-05-05 NOTE — ASSESSMENT & PLAN NOTE
Today's BP: 108/64  At goal per JNC-8 guidelines  No dizziness, blurred vision, or headache  Microalbumin/creatinine and CMP from 2/2022 within normal limits  - Continue Atenolol 50 mg daily

## 2022-05-05 NOTE — PROGRESS NOTES
Assessment/Plan:    Essential hypertension  Today's BP: 108/64  At goal per JNC-8 guidelines  No dizziness, blurred vision, or headache  Microalbumin/creatinine and CMP from 2/2022 within normal limits  - Continue Atenolol 50 mg daily  Graves disease  Methimazole was discontinued early in 2021 (took it from 9933-1668)  However, due to multiple abnormal thyroid labs after discontinuation, was restarted in 12/2021  Repeat labs from 1/2022 showed decreased TSH with normal T3 and T4- will continue Methimazole  Patient denies palpitations, anxiety, tremor, sweating, heat intolerance, diarrhea, constipation, dyspnea, orthopnea, leg swelling, dysphagia, voice change, appetite change, skin/hair changes, or insomnia  Weight is stable  PHQ-2 today is 0  Patient follows with [facility]  - Continue Atenolol 50 mg daily  - Continue Methimazole 5 mg daily  May decrease to 2 5 mg if labs are stable  - Continue with Patanol & Zaditor eye drops PRN  - Follow up in 3 months  Varicose veins of both lower extremities with pain  Has been using compression stockings with significant symptomatic relief  She says she will continue to use these and follow with vascular surgery as needed  Diagnoses and all orders for this visit:    Graves disease    Essential hypertension    Varicose veins of both lower extremities with pain          Subjective:      Patient ID: Shell Deshpande is a 62 y o  female  A very pleasant 63year old female with a PMH of Graves disease (diagnosed in 2018) and hypertension presents to the clinic today for a follow-up on her chronic conditions  Screening mammogram done in March 2021 was normal  She states she did have a prior colonoscopy (does not remember when: 2014 or 2015), which was normal  Patient has no concerns today        The following portions of the patient's history were reviewed and updated as appropriate: allergies, current medications, past family history, past medical history, past social history, past surgical history and problem list     Review of Systems   Constitutional: Negative for activity change, appetite change, chills and fever  HENT: Negative for sore throat  Respiratory: Negative for cough and shortness of breath  Cardiovascular: Negative for chest pain, palpitations and leg swelling  Gastrointestinal: Negative for abdominal pain, constipation, diarrhea, nausea and vomiting  Musculoskeletal: Negative for back pain and gait problem  Neurological: Negative for dizziness, seizures, weakness and headaches  Objective:      /64 (BP Location: Right arm, Patient Position: Sitting, Cuff Size: Standard)   Pulse 73   Temp (!) 97 4 °F (36 3 °C) (Temporal)   Resp 18   Ht 5' 2" (1 575 m)   Wt 65 2 kg (143 lb 12 8 oz)   SpO2 98%   BMI 26 30 kg/m²          Physical Exam  Vitals reviewed  Constitutional:       General: She is not in acute distress  Appearance: She is well-developed  She is not diaphoretic  Comments: Quiet voice    HENT:      Head: Normocephalic and atraumatic  Eyes:      Conjunctiva/sclera: Conjunctivae normal    Cardiovascular:      Rate and Rhythm: Normal rate and regular rhythm  Heart sounds: Normal heart sounds  No murmur heard  No friction rub  No gallop  Pulmonary:      Effort: Pulmonary effort is normal  No respiratory distress  Breath sounds: Normal breath sounds  No wheezing or rales  Abdominal:      General: Bowel sounds are normal  There is no distension  Palpations: Abdomen is soft  There is no mass  Tenderness: There is no abdominal tenderness  There is no guarding  Musculoskeletal:         General: No tenderness or deformity  Normal range of motion  Cervical back: Normal range of motion  Right lower leg: No edema  Left lower leg: No edema  Skin:     General: Skin is warm and dry  Neurological:      General: No focal deficit present        Mental Status: She is alert and oriented to person, place, and time           Cassandra Gabriel MD  5/6/2022

## 2022-05-05 NOTE — ASSESSMENT & PLAN NOTE
Methimazole was discontinued early in 2021 (took it from 0566-9717)  However, due to multiple abnormal thyroid labs after discontinuation, was restarted in 12/2021  Repeat labs from 1/2022 showed decreased TSH with normal T3 and T4- will continue Methimazole  Patient denies palpitations, anxiety, tremor, sweating, heat intolerance, diarrhea, constipation, dyspnea, orthopnea, leg swelling, dysphagia, voice change, appetite change, skin/hair changes, or insomnia  Weight is stable  PHQ-2 today is 0  Patient follows with [facility]  - Continue Atenolol 50 mg daily  - Continue Methimazole 5 mg daily  May decrease to 2 5 mg if labs are stable  - Continue with Patanol & Zaditor eye drops PRN  - Follow up in 3 months

## 2022-05-06 ENCOUNTER — APPOINTMENT (OUTPATIENT)
Dept: LAB | Facility: HOSPITAL | Age: 62
End: 2022-05-06
Payer: MEDICARE

## 2022-05-06 ENCOUNTER — OFFICE VISIT (OUTPATIENT)
Dept: FAMILY MEDICINE CLINIC | Facility: CLINIC | Age: 62
End: 2022-05-06

## 2022-05-06 VITALS
WEIGHT: 143.8 LBS | HEART RATE: 73 BPM | BODY MASS INDEX: 26.46 KG/M2 | RESPIRATION RATE: 18 BRPM | SYSTOLIC BLOOD PRESSURE: 108 MMHG | OXYGEN SATURATION: 98 % | DIASTOLIC BLOOD PRESSURE: 64 MMHG | TEMPERATURE: 97.4 F | HEIGHT: 62 IN

## 2022-05-06 DIAGNOSIS — I83.813 VARICOSE VEINS OF BOTH LOWER EXTREMITIES WITH PAIN: ICD-10-CM

## 2022-05-06 DIAGNOSIS — E05.00 TOXIC DIFFUSE GOITER WITH EXOPHTHALMOS: ICD-10-CM

## 2022-05-06 DIAGNOSIS — E05.00 GRAVES DISEASE: Primary | ICD-10-CM

## 2022-05-06 DIAGNOSIS — I10 ESSENTIAL HYPERTENSION: ICD-10-CM

## 2022-05-06 LAB
ALBUMIN SERPL BCP-MCNC: 4.4 G/DL (ref 3–5.2)
ALP SERPL-CCNC: 66 U/L (ref 43–122)
ALT SERPL W P-5'-P-CCNC: 19 U/L
AST SERPL W P-5'-P-CCNC: 30 U/L (ref 14–36)
BILIRUB DIRECT SERPL-MCNC: 0.22 MG/DL
BILIRUB SERPL-MCNC: 1.06 MG/DL
PROT SERPL-MCNC: 8.4 G/DL (ref 5.9–8.4)
T3 SERPL-MCNC: 1 NG/ML (ref 0.6–1.8)
T4 FREE SERPL-MCNC: 0.67 NG/DL (ref 0.76–1.46)
TSH SERPL DL<=0.05 MIU/L-ACNC: 1.24 UIU/ML (ref 0.45–4.5)

## 2022-05-06 PROCEDURE — 84480 ASSAY TRIIODOTHYRONINE (T3): CPT

## 2022-05-06 PROCEDURE — 99213 OFFICE O/P EST LOW 20 MIN: CPT | Performed by: FAMILY MEDICINE

## 2022-05-06 PROCEDURE — 84443 ASSAY THYROID STIM HORMONE: CPT

## 2022-05-06 PROCEDURE — 36415 COLL VENOUS BLD VENIPUNCTURE: CPT

## 2022-05-06 PROCEDURE — 80076 HEPATIC FUNCTION PANEL: CPT

## 2022-05-06 PROCEDURE — 84439 ASSAY OF FREE THYROXINE: CPT

## 2022-10-18 ENCOUNTER — APPOINTMENT (OUTPATIENT)
Dept: LAB | Facility: HOSPITAL | Age: 62
End: 2022-10-18
Payer: MEDICARE

## 2022-10-18 DIAGNOSIS — E05.00 BASEDOW'S DISEASE: ICD-10-CM

## 2022-10-18 LAB
ALBUMIN SERPL BCP-MCNC: 4.4 G/DL (ref 3.5–5)
ALP SERPL-CCNC: 61 U/L (ref 43–122)
ALT SERPL W P-5'-P-CCNC: 24 U/L
AST SERPL W P-5'-P-CCNC: 32 U/L (ref 14–36)
BASOPHILS # BLD AUTO: 0.03 THOUSANDS/ΜL (ref 0–0.1)
BASOPHILS NFR BLD AUTO: 1 % (ref 0–1)
BILIRUB DIRECT SERPL-MCNC: 0.01 MG/DL (ref 0–0.2)
BILIRUB SERPL-MCNC: 0.75 MG/DL (ref 0.2–1)
EOSINOPHIL # BLD AUTO: 0.19 THOUSAND/ΜL (ref 0–0.61)
EOSINOPHIL NFR BLD AUTO: 3 % (ref 0–6)
ERYTHROCYTE [DISTWIDTH] IN BLOOD BY AUTOMATED COUNT: 13.1 % (ref 11.6–15.1)
HCT VFR BLD AUTO: 41 % (ref 34.8–46.1)
HGB BLD-MCNC: 12.9 G/DL (ref 11.5–15.4)
IMM GRANULOCYTES # BLD AUTO: 0.01 THOUSAND/UL (ref 0–0.2)
IMM GRANULOCYTES NFR BLD AUTO: 0 % (ref 0–2)
LYMPHOCYTES # BLD AUTO: 2.18 THOUSANDS/ΜL (ref 0.6–4.47)
LYMPHOCYTES NFR BLD AUTO: 37 % (ref 14–44)
MCH RBC QN AUTO: 26.4 PG (ref 26.8–34.3)
MCHC RBC AUTO-ENTMCNC: 31.5 G/DL (ref 31.4–37.4)
MCV RBC AUTO: 84 FL (ref 82–98)
MONOCYTES # BLD AUTO: 0.53 THOUSAND/ΜL (ref 0.17–1.22)
MONOCYTES NFR BLD AUTO: 9 % (ref 4–12)
NEUTROPHILS # BLD AUTO: 3 THOUSANDS/ΜL (ref 1.85–7.62)
NEUTS SEG NFR BLD AUTO: 50 % (ref 43–75)
NRBC BLD AUTO-RTO: 0 /100 WBCS
PLATELET # BLD AUTO: 293 THOUSANDS/UL (ref 149–390)
PMV BLD AUTO: 10.5 FL (ref 8.9–12.7)
PROT SERPL-MCNC: 8.4 G/DL (ref 6.4–8.4)
RBC # BLD AUTO: 4.88 MILLION/UL (ref 3.81–5.12)
T3 SERPL-MCNC: 1.4 NG/ML (ref 0.6–1.8)
T4 FREE SERPL-MCNC: 0.78 NG/DL (ref 0.76–1.46)
TSH SERPL DL<=0.05 MIU/L-ACNC: 5.11 UIU/ML (ref 0.45–4.5)
WBC # BLD AUTO: 5.94 THOUSAND/UL (ref 4.31–10.16)

## 2022-10-18 PROCEDURE — 84480 ASSAY TRIIODOTHYRONINE (T3): CPT

## 2022-10-18 PROCEDURE — 85025 COMPLETE CBC W/AUTO DIFF WBC: CPT

## 2022-10-18 PROCEDURE — 80076 HEPATIC FUNCTION PANEL: CPT

## 2022-10-18 PROCEDURE — 84443 ASSAY THYROID STIM HORMONE: CPT

## 2022-10-18 PROCEDURE — 84439 ASSAY OF FREE THYROXINE: CPT

## 2022-10-18 PROCEDURE — 36415 COLL VENOUS BLD VENIPUNCTURE: CPT

## 2023-02-17 NOTE — ASSESSMENT & PLAN NOTE
Lipid Panel from 1/2021 within normal limits  ALT also within normal limits  - Continue Lipitor 40 mg daily   - Repeat lipid panel ordered

## 2023-02-17 NOTE — ASSESSMENT & PLAN NOTE
TSH from 10/2022 mildly elevated at 5  11  Patient currently on Methimazole 2 5 mg daily  Patient denies palpitations, anxiety, tremor, sweating, heat intolerance, diarrhea, constipation, dyspnea, orthopnea, leg swelling, dysphagia, voice change, appetite change, skin/hair changes, or insomnia  Weight is stable  PHQ-2 today is 0  Patient follows with [facility]  Appears they stopped her Atenolol and decreased her Methimazole dose to 2 5 mg daily  Patient informed of this today  - Continue Methimazole 2 5 mg daily  - Continue with Patanol & Zaditor eye drops PRN  - Follow up in 3 months

## 2023-02-17 NOTE — ASSESSMENT & PLAN NOTE
Today's BP: 142/80  At goal per JNC-8 guidelines  No dizziness, blurred vision, or headache  Microalbumin/creatinine and CMP from 2/2022 within normal limits  - Continue Atenolol 50 mg daily   - Repeat BMP and urine M/C ordered

## 2023-02-17 NOTE — PROGRESS NOTES
[address] [facility] [place]    NAME: Kehinde Hung  AGE: 65 y o  SEX: female  : 1960     DATE: 2023     Assessment and Plan:     Health Maintenance:    - Flu vaccine given today  - Will schedule pap smear with me    - mammogram from 2023 showed no evidence of malignancy  - Patient last had colonoscopy in [id number] (cannot remember)  States it was normal  I have placed a referral today for a repeat to be completed  Problem List Items Addressed This Visit        Endocrine    Graves disease     TSH from 10/2022 mildly elevated at 5  11  Patient currently on Methimazole 2 5 mg daily  Patient denies palpitations, anxiety, tremor, sweating, heat intolerance, diarrhea, constipation, dyspnea, orthopnea, leg swelling, dysphagia, voice change, appetite change, skin/hair changes, or insomnia  Weight is stable  PHQ-2 today is 0  Patient follows with [facility]  Appears they stopped her Atenolol and decreased her Methimazole dose to 2 5 mg daily  Patient informed of this today  - Continue Methimazole 2 5 mg daily  - Continue with Patanol & Zaditor eye drops PRN  - Follow up in 3 months  Relevant Medications    methimazole (TAPAZOLE) 5 mg tablet    naproxen (Naprosyn) 500 mg tablet    olopatadine (PATANOL) 0 1 % ophthalmic solution       Cardiovascular and Mediastinum    Essential hypertension - Primary     Today's BP: 142/80  At goal per JNC-8 guidelines  No dizziness, blurred vision, or headache  Microalbumin/creatinine and CMP from 2022 within normal limits  - Continue Atenolol 50 mg daily   - Repeat BMP and urine M/C ordered  Relevant Orders    Microalbumin / creatinine urine ratio    Basic metabolic panel       Other    Hyperlipidemia     Lipid Panel from 2021 within normal limits  ALT also within normal limits  - Continue Lipitor 40 mg daily   - Repeat lipid panel ordered  Relevant Orders    Lipid Panel with Direct LDL reflex    Right shoulder pain     Right shoulder pain that patient states has been present on and off for a few months  Pain usually aggravated when she lifts boxes at work  Pain is not always present and comes and goes  Physical exam noted below  No tenderness noted on PE today; strength and sensation normal  Pulses wnl  Patient has decreased ROM on abduction of right shoulder- can lift up to 90 degrees passively and without pain- can lift above that but with some pain  Could be osteoarthritis versus a component of rotator cuff injury versus a muscular sprain due to nature and location of the pain  - X-ray right shoulder ordered  - Will trial Naproxen 500 mg BID x 10-14 days  Kidney function intact  Patient advised on how to take this medication  No history of GI ulcers/bleeding    - Voltaren gel PRN    - Exercises provided  - Will continue to monitor  Relevant Medications    naproxen (Naprosyn) 500 mg tablet    Diclofenac Sodium (VOLTAREN) 1 %    Other Relevant Orders    XR shoulder 2+ vw right   Other Visit Diagnoses     Encounter for immunization        Relevant Orders    influenza vaccine, quadrivalent, recombinant, PF, 0 5 mL, for patients 18 yr+ (FLUBLOK)    Encounter for screening mammogram for malignant neoplasm of breast        Encounter for screening colonoscopy        Relevant Orders    Ambulatory Referral to Gastroenterology          Immunizations and preventive care screenings were discussed with patient today  Appropriate education was printed on patient's after visit summary  Counseling:  Alcohol/drug use: discussed moderation in alcohol intake, the recommendations for healthy alcohol use, and avoidance of illicit drug use  Dental Health: discussed importance of regular tooth brushing, flossing, and dental visits    Injury prevention: discussed safety/seat belts, safety helmets, smoke detectors, carbon dioxide detectors, and smoking near bedding or upholstery  Sexual health: discussed sexually transmitted diseases, partner selection, use of condoms, avoidance of unintended pregnancy, and contraceptive alternatives  · Exercise: the importance of regular exercise/physical activity was discussed  Recommend exercise 3-5 times per week for at least 30 minutes  Return in about 1 week (around 3/1/2023) for pap smear with Dr Ramond Osler   Chief Complaint:     Chief Complaint   Patient presents with   • Physical Exam      History of Present Illness:     Adult Annual Physical   Patient here for a comprehensive physical exam  The patient reports no problems  Diet and Physical Activity  · Diet/Nutrition: well balanced diet, limited junk food and consuming 3-5 servings of fruits/vegetables daily  · Exercise: walking and 3-4 times a week on average  Depression Screening  PHQ-2/9 Depression Screening         General Health  · Sleep: sleeps well  · Hearing: normal - bilateral   · Vision: no vision problems and wears glasses  /GYN Health  · Patient is: postmenopausal       Review of Systems:     Review of Systems   Constitutional: Negative for activity change, appetite change, chills and fever  HENT: Negative for sore throat  Respiratory: Negative for cough and shortness of breath  Cardiovascular: Negative for chest pain, palpitations and leg swelling  Gastrointestinal: Negative for abdominal pain, constipation, diarrhea, nausea and vomiting  Musculoskeletal: Negative for back pain and gait problem  Neurological: Negative for dizziness, seizures, weakness and headaches  Past Medical History:     Past Medical History:   Diagnosis Date   • Graves disease    • Hypertension       Past Surgical History:     No past surgical history on file     Social History:     Social History     Socioeconomic History   • Marital status: /Civil Union     Spouse name: None   • Number of children: None   • Years of education: None   • Highest education level: None   Occupational History   • None   Tobacco Use   • Smoking status: Never   • Smokeless tobacco: Never   Substance and Sexual Activity   • Alcohol use: No   • Drug use: No   • Sexual activity: None   Other Topics Concern   • None   Social History Narrative    No caffeine use     Social Determinants of Health     Financial Resource Strain: Low Risk    • Difficulty of Paying Living Expenses: Not hard at all   Food Insecurity: No Food Insecurity   • Worried About Running Out of Food in the Last Year: Never true   • Ran Out of Food in the Last Year: Never true   Transportation Needs: No Transportation Needs   • Lack of Transportation (Medical): No   • Lack of Transportation (Non-Medical):  No   Physical Activity: Not on file   Stress: Not on file   Social Connections: Not on file   Intimate Partner Violence: Not on file   Housing Stability: Not on file      Family History:     Family History   Problem Relation Age of Onset   • No Known Problems Mother    • No Known Problems Father    • No Known Problems Sister    • No Known Problems Daughter    • No Known Problems Maternal Grandmother    • No Known Problems Maternal Grandfather    • No Known Problems Paternal Grandmother    • No Known Problems Paternal Grandfather       Current Medications:     Current Outpatient Medications   Medication Sig Dispense Refill   • Diclofenac Sodium (VOLTAREN) 1 % Apply 2 g topically 4 (four) times a day 100 g 1   • methimazole (TAPAZOLE) 5 mg tablet Take 0 5 tablets (2 5 mg total) by mouth daily 90 tablet 2   • naproxen (Naprosyn) 500 mg tablet Take 1 tablet (500 mg total) by mouth 2 (two) times a day with meals 30 tablet 0   • olopatadine (PATANOL) 0 1 % ophthalmic solution Apply 1 drop to eye 2 (two) times a day 5 mL 2   • ARTIFICIAL TEARS 1 4 % ophthalmic solution      • atorvastatin (LIPITOR) 40 mg tablet Take 1 tablet (40 mg total) by mouth daily 30 tablet 2   • Blood Pressure Monitoring (BLOOD PRESSURE CUFF) MISC by Does not apply route daily 1 each 0   • [employer] ALLERGY RELIEF 10 MG tablet TAKE 1 TABLET BY MOUTH DAILY  3   • ferrous sulfate 325 (65 Fe) mg tablet Take 325 mg by mouth     • ketotifen (ZADITOR) 0 025 % ophthalmic solution      • NATURAL BALANCE TEARS 0 1-0 3 % ophthalmic solution        No current facility-administered medications for this visit  Allergies: Allergies   Allergen Reactions   • No Active Allergies       Physical Exam:     /80 (BP Location: Left arm, Patient Position: Sitting, Cuff Size: Standard)   Pulse 104   Temp 97 6 °F (36 4 °C) (Temporal)   Resp 18   Ht 5' 2" (1 575 m)   Wt 67 6 kg (149 lb)   SpO2 98%   BMI 27 25 kg/m²     Physical Exam  Vitals and nursing note reviewed  Constitutional:       General: She is not in acute distress  Appearance: She is well-developed  Comments: Quiet voice   HENT:      Head: Normocephalic and atraumatic  Eyes:      Conjunctiva/sclera: Conjunctivae normal    Cardiovascular:      Rate and Rhythm: Normal rate and regular rhythm  Heart sounds: No murmur heard  Pulmonary:      Effort: Pulmonary effort is normal  No respiratory distress  Breath sounds: Normal breath sounds  Abdominal:      Palpations: Abdomen is soft  Tenderness: There is no abdominal tenderness  Musculoskeletal:         General: No swelling  Right shoulder: No swelling, deformity, effusion, tenderness or crepitus  Decreased range of motion  Normal strength  Normal pulse  Cervical back: Neck supple  Comments: Right Shoulder:    Erythema: no  Swelling: no  Increased Warmth: no  Ecchymosis: no    Tenderness: none    ROM: decreased abduction of right shoulder  Can go up to 90 degrees and requires assistance to lift her arm above that       Touchdown Sign: intact  Apley Scratch Test: symmetric  Passive: symmetric    Strength:    Abduction: 5/5  ER: 5/5  IR: 5/5    Special Tests:    Drop-Arm: negative    Impingement Tests:     Yoder: negative  Neer: negative  Cross-Arm: negative  Speeds: negative    Internal Impingement:    Left Shoulder:    Tenderness: none    ROM:    Touchdown Sign: intact  Passive: symmetric    Strength:    Abduction: 5/5  ER: 5/5  IR: 5/5         Skin:     General: Skin is warm and dry  Capillary Refill: Capillary refill takes less than 2 seconds  Neurological:      Mental Status: She is alert     Psychiatric:         Mood and Affect: Mood normal           MD Micaela Crystal

## 2023-02-22 ENCOUNTER — LAB (OUTPATIENT)
Dept: LAB | Facility: HOSPITAL | Age: 63
End: 2023-02-22

## 2023-02-22 ENCOUNTER — OFFICE VISIT (OUTPATIENT)
Dept: FAMILY MEDICINE CLINIC | Facility: CLINIC | Age: 63
End: 2023-02-22

## 2023-02-22 VITALS
DIASTOLIC BLOOD PRESSURE: 80 MMHG | BODY MASS INDEX: 27.42 KG/M2 | HEART RATE: 104 BPM | WEIGHT: 149 LBS | SYSTOLIC BLOOD PRESSURE: 142 MMHG | OXYGEN SATURATION: 98 % | TEMPERATURE: 97.6 F | HEIGHT: 62 IN | RESPIRATION RATE: 18 BRPM

## 2023-02-22 DIAGNOSIS — Z12.31 ENCOUNTER FOR SCREENING MAMMOGRAM FOR MALIGNANT NEOPLASM OF BREAST: ICD-10-CM

## 2023-02-22 DIAGNOSIS — M25.511 RIGHT SHOULDER PAIN, UNSPECIFIED CHRONICITY: ICD-10-CM

## 2023-02-22 DIAGNOSIS — E05.00 GRAVES DISEASE: ICD-10-CM

## 2023-02-22 DIAGNOSIS — E78.2 MIXED HYPERLIPIDEMIA: ICD-10-CM

## 2023-02-22 DIAGNOSIS — I10 ESSENTIAL HYPERTENSION: Primary | ICD-10-CM

## 2023-02-22 DIAGNOSIS — Z12.11 ENCOUNTER FOR SCREENING COLONOSCOPY: ICD-10-CM

## 2023-02-22 DIAGNOSIS — Z23 ENCOUNTER FOR IMMUNIZATION: ICD-10-CM

## 2023-02-22 DIAGNOSIS — I10 ESSENTIAL HYPERTENSION: ICD-10-CM

## 2023-02-22 LAB
ANION GAP SERPL CALCULATED.3IONS-SCNC: 6 MMOL/L (ref 5–14)
BUN SERPL-MCNC: 9 MG/DL (ref 5–25)
CALCIUM SERPL-MCNC: 9.1 MG/DL (ref 8.4–10.2)
CHLORIDE SERPL-SCNC: 106 MMOL/L (ref 96–108)
CHOLEST SERPL-MCNC: 162 MG/DL
CO2 SERPL-SCNC: 31 MMOL/L (ref 21–32)
CREAT SERPL-MCNC: 0.57 MG/DL (ref 0.6–1.2)
CREAT UR-MCNC: 77.9 MG/DL
GFR SERPL CREATININE-BSD FRML MDRD: 100 ML/MIN/1.73SQ M
GLUCOSE P FAST SERPL-MCNC: 93 MG/DL (ref 70–99)
HDLC SERPL-MCNC: 47 MG/DL
LDLC SERPL CALC-MCNC: 95 MG/DL
MICROALBUMIN UR-MCNC: 9.9 MG/L (ref 0–20)
MICROALBUMIN/CREAT 24H UR: 13 MG/G CREATININE (ref 0–30)
POTASSIUM SERPL-SCNC: 4.5 MMOL/L (ref 3.5–5.3)
SODIUM SERPL-SCNC: 143 MMOL/L (ref 135–147)
TRIGL SERPL-MCNC: 102 MG/DL

## 2023-02-22 RX ORDER — METHIMAZOLE 5 MG/1
2.5 TABLET ORAL DAILY
Qty: 90 TABLET | Refills: 2 | Status: SHIPPED | OUTPATIENT
Start: 2023-02-22

## 2023-02-22 RX ORDER — ATENOLOL 50 MG/1
50 TABLET ORAL DAILY
Qty: 90 TABLET | Refills: 2 | Status: SHIPPED | OUTPATIENT
Start: 2023-02-22 | End: 2023-02-22

## 2023-02-22 RX ORDER — OLOPATADINE HYDROCHLORIDE 1 MG/ML
1 SOLUTION/ DROPS OPHTHALMIC 2 TIMES DAILY
Qty: 5 ML | Refills: 2 | Status: SHIPPED | OUTPATIENT
Start: 2023-02-22 | End: 2024-02-22

## 2023-02-22 RX ORDER — NAPROXEN 500 MG/1
500 TABLET ORAL 2 TIMES DAILY WITH MEALS
Qty: 30 TABLET | Refills: 0 | Status: SHIPPED | OUTPATIENT
Start: 2023-02-22

## 2023-02-22 NOTE — ASSESSMENT & PLAN NOTE
Right shoulder pain that patient states has been present on and off for a few months  Pain usually aggravated when she lifts boxes at work  Pain is not always present and comes and goes  Physical exam noted below  No tenderness noted on PE today; strength and sensation normal  Pulses wnl  Patient has decreased ROM on abduction of right shoulder- can lift up to 90 degrees passively and without pain- can lift above that but with some pain  Could be osteoarthritis versus a component of rotator cuff injury versus a muscular sprain due to nature and location of the pain  - X-ray right shoulder ordered  - Will trial Naproxen 500 mg BID x 10-14 days  Kidney function intact  Patient advised on how to take this medication  No history of GI ulcers/bleeding    - Voltaren gel PRN    - Exercises provided  - Will continue to monitor

## 2023-02-25 NOTE — PROGRESS NOTES
Assessment     Aroldo Pittman is a 65 y o   with normal gynecologic exam      Plan     1  Routine well woman exam done today  2   Pap and HPV:Pap with HPV was done today  Current ASCCP Guidelines reviewed  3   Mammogram from 2023 showed no evidence of malignancy   4  Colonoscopy recommended per guidelines  Ordered at our last visit  5  The patient is sexually active  6  The following were reviewed in today's visit: breast self exam, STD testing, HIV risk factors and prevention, menopause, osteoporosis, adequate intake of calcium and vitamin D, exercise and healthy diet  STD testing ordered  7  Patient to return to office in 3 months for regular follow up  Subjective     Aroldo Pittman is a 65 y o  female who presents for annual well woman exam      GYN:  · No vaginal discharge, labial erythema or lesions, dyspareunia     · Contraception: None  · Patient is sexually active with one partner  · Gynecologic surgery: None    :  · No dysuria, urinary frequency or urgency  · No hematuria, flank pain, incontinence  Breast:  · No breast mass, skin changes, dimpling, reddening, nipple retraction  · No breast discharge  · Last mammogram was in 2023  Results were negative for malignancy  · Patient does not have a family history of breast, endometrial, or ovarian ca  General:  · Diet: good, balanced  · Exercise: walking a few times a week  · Work: manager at [email or web address]  · ETOH use: no  · Tobacco use: no  · Recreational drug use: none    Screening:  · Cervical cancer: last pap smear in 2018  Results were negative for malignancy as well as HPV  Review of Systems  Pertinent items are noted in HPI  Objective      /80 (BP Location: Left arm, Patient Position: Sitting, Cuff Size: Standard)   Pulse 83   Temp (!) 97 3 °F (36 3 °C) (Temporal)   Resp 16   Ht 5' 2" (1 575 m)   Wt 68 kg (150 lb)   SpO2 97%   BMI 27 44 kg/m²     Physical Exam  Vitals reviewed   Exam conducted with a chaperone present  Constitutional:       General: She is not in acute distress  Appearance: She is well-developed  She is not diaphoretic  HENT:      Head: Normocephalic and atraumatic  Eyes:      Conjunctiva/sclera: Conjunctivae normal    Cardiovascular:      Rate and Rhythm: Normal rate and regular rhythm  Heart sounds: Normal heart sounds  No murmur heard  No friction rub  No gallop  Pulmonary:      Effort: Pulmonary effort is normal  No respiratory distress  Breath sounds: Normal breath sounds  No wheezing or rales  Abdominal:      General: Bowel sounds are normal  There is no distension  Palpations: Abdomen is soft  There is no mass  Tenderness: There is no abdominal tenderness  There is no guarding  Genitourinary:     General: Normal vulva  Exam position: Lithotomy position  Pubic Area: No rash or pubic lice  Philipp stage (genital): 4       Labia:         Right: No rash, tenderness, lesion or injury  Left: No rash, tenderness, lesion or injury  Vagina: No signs of injury and foreign body  No vaginal discharge, erythema, tenderness, bleeding, lesions or prolapsed vaginal walls  Cervix: No discharge, friability, lesion or cervical bleeding  Adnexa:         Right: No tenderness  Left: No tenderness  Musculoskeletal:         General: No tenderness or deformity  Normal range of motion  Cervical back: Normal range of motion  Right lower leg: No edema  Left lower leg: No edema  Skin:     General: Skin is warm and dry  Neurological:      General: No focal deficit present  Mental Status: She is alert and oriented to person, place, and time                [facility]

## 2023-03-01 ENCOUNTER — ANNUAL EXAM (OUTPATIENT)
Dept: FAMILY MEDICINE CLINIC | Facility: CLINIC | Age: 63
End: 2023-03-01

## 2023-03-01 ENCOUNTER — LAB (OUTPATIENT)
Dept: LAB | Facility: HOSPITAL | Age: 63
End: 2023-03-01

## 2023-03-01 VITALS
HEART RATE: 83 BPM | OXYGEN SATURATION: 97 % | RESPIRATION RATE: 16 BRPM | TEMPERATURE: 97.3 F | BODY MASS INDEX: 27.6 KG/M2 | DIASTOLIC BLOOD PRESSURE: 80 MMHG | HEIGHT: 62 IN | SYSTOLIC BLOOD PRESSURE: 136 MMHG | WEIGHT: 150 LBS

## 2023-03-01 DIAGNOSIS — Z12.4 CERVICAL CANCER SCREENING: ICD-10-CM

## 2023-03-01 DIAGNOSIS — M25.511 ACUTE PAIN OF RIGHT SHOULDER: ICD-10-CM

## 2023-03-01 DIAGNOSIS — Z01.419 ENCOUNTER FOR ANNUAL ROUTINE GYNECOLOGICAL EXAMINATION: Primary | ICD-10-CM

## 2023-03-01 DIAGNOSIS — Z11.3 SCREEN FOR STD (SEXUALLY TRANSMITTED DISEASE): ICD-10-CM

## 2023-03-02 LAB
HIV 1+2 AB+HIV1 P24 AG SERPL QL IA: NORMAL
HIV 2 AB SERPL QL IA: NORMAL
HIV1 AB SERPL QL IA: NORMAL
HIV1 P24 AG SERPL QL IA: NORMAL
TREPONEMA PALLIDUM IGG+IGM AB [PRESENCE] IN SERUM OR PLASMA BY IMMUNOASSAY: NORMAL

## 2023-03-03 LAB
HPV HR 12 DNA CVX QL NAA+PROBE: NEGATIVE
HPV16 DNA CVX QL NAA+PROBE: NEGATIVE
HPV18 DNA CVX QL NAA+PROBE: NEGATIVE

## 2023-03-04 LAB
C TRACH DNA SPEC QL NAA+PROBE: NEGATIVE
N GONORRHOEA DNA SPEC QL NAA+PROBE: NEGATIVE

## 2023-03-08 LAB
LAB AP GYN PRIMARY INTERPRETATION: NORMAL
Lab: NORMAL

## 2023-03-08 NOTE — RESULT ENCOUNTER NOTE
Please call patient and inform her that all of her STD testing is negative (syphilis, HIV, gonorrhea, chlamydia)  Her pap smear is negative for cancer as well as HPV infection  Thank you!

## 2023-03-11 DIAGNOSIS — M25.511 RIGHT SHOULDER PAIN, UNSPECIFIED CHRONICITY: ICD-10-CM

## 2023-03-13 RX ORDER — NAPROXEN 500 MG/1
TABLET ORAL
Qty: 30 TABLET | Refills: 0 | Status: SHIPPED | OUTPATIENT
Start: 2023-03-13

## 2023-04-06 ENCOUNTER — APPOINTMENT (OUTPATIENT)
Dept: LAB | Facility: HOSPITAL | Age: 63
End: 2023-04-06

## 2023-04-06 DIAGNOSIS — E05.00 GRAVES' DISEASE: ICD-10-CM

## 2023-04-06 LAB
T3 SERPL-MCNC: 0.8 NG/ML (ref 0.6–1.8)
T4 FREE SERPL-MCNC: 0.68 NG/DL (ref 0.76–1.46)
TSH SERPL DL<=0.05 MIU/L-ACNC: 3.1 UIU/ML (ref 0.45–4.5)

## 2023-05-01 DIAGNOSIS — M25.511 RIGHT SHOULDER PAIN, UNSPECIFIED CHRONICITY: ICD-10-CM

## 2023-05-02 RX ORDER — NAPROXEN 500 MG/1
TABLET ORAL
Qty: 30 TABLET | Refills: 0 | Status: SHIPPED | OUTPATIENT
Start: 2023-05-02

## 2023-06-05 ENCOUNTER — HOSPITAL ENCOUNTER (OUTPATIENT)
Dept: RADIOLOGY | Facility: HOSPITAL | Age: 63
Discharge: HOME | End: 2023-06-05
Payer: MEDICARE

## 2023-06-05 ENCOUNTER — OFFICE VISIT (OUTPATIENT)
Dept: FAMILY MEDICINE CLINIC | Facility: CLINIC | Age: 63
End: 2023-06-05

## 2023-06-05 VITALS
DIASTOLIC BLOOD PRESSURE: 88 MMHG | TEMPERATURE: 98.5 F | BODY MASS INDEX: 27.86 KG/M2 | HEIGHT: 62 IN | HEART RATE: 91 BPM | SYSTOLIC BLOOD PRESSURE: 140 MMHG | OXYGEN SATURATION: 98 % | WEIGHT: 151.4 LBS | RESPIRATION RATE: 17 BRPM

## 2023-06-05 DIAGNOSIS — M25.511 RIGHT SHOULDER PAIN, UNSPECIFIED CHRONICITY: ICD-10-CM

## 2023-06-05 DIAGNOSIS — Z12.11 COLON CANCER SCREENING: ICD-10-CM

## 2023-06-05 DIAGNOSIS — I10 ESSENTIAL HYPERTENSION: ICD-10-CM

## 2023-06-05 DIAGNOSIS — E78.2 MIXED HYPERLIPIDEMIA: ICD-10-CM

## 2023-06-05 DIAGNOSIS — E05.00 GRAVES DISEASE: Primary | ICD-10-CM

## 2023-06-05 PROCEDURE — 73030 X-RAY EXAM OF SHOULDER: CPT

## 2023-06-05 PROCEDURE — 99213 OFFICE O/P EST LOW 20 MIN: CPT | Performed by: FAMILY MEDICINE

## 2023-06-05 RX ORDER — ATORVASTATIN CALCIUM 40 MG/1
40 TABLET, FILM COATED ORAL DAILY
Qty: 30 TABLET | Refills: 2 | Status: SHIPPED | OUTPATIENT
Start: 2023-06-05

## 2023-06-05 RX ORDER — LISINOPRIL 5 MG/1
5 TABLET ORAL DAILY
Qty: 90 TABLET | Refills: 1 | Status: SHIPPED | OUTPATIENT
Start: 2023-06-05

## 2023-06-05 NOTE — PROGRESS NOTES
Assessment/Plan:    Graves disease  TSH from 4/2023 wnl with mildly decreased free T4 levels  Patient currently on Methimazole 2 5 mg daily  Patient denies palpitations, anxiety, tremor, sweating, heat intolerance, diarrhea, constipation, dyspnea, orthopnea, leg swelling, dysphagia, voice change, appetite change, skin/hair changes, or insomnia  Weight is stable  PHQ-2 today is 0  Patient follows with [facility]  - Continue Methimazole 2 5 mg daily  - Continue with Patanol & Zaditor eye drops PRN  - Follow up in 3 months  Essential hypertension  Today's BP: 140/88  At goal per JNC-8 guidelines  However, has had a few elevated blood pressures at previous visits  No dizziness, blurred vision, or headache  Microalbumin/creatinine and CMP from 2/2023 within normal limits      - Start Lisinopril 5 mg daily  Patient was previously on Atenolol for concurrent Graves disease, which was discontinued due to good control of thyroid  Have her return in 2-3 weeks for a blood pressure check  Hyperlipidemia  Lipid Panel from 2/2023 showed mildly elevated LDL levels  - Continue Lipitor 40 mg daily  Diagnoses and all orders for this visit:    Graves disease    Essential hypertension  -     lisinopril (ZESTRIL) 5 mg tablet; Take 1 tablet (5 mg total) by mouth daily    Mixed hyperlipidemia  -     atorvastatin (LIPITOR) 40 mg tablet; Take 1 tablet (40 mg total) by mouth daily    Colon cancer screening          Subjective:      Patient ID: Colletta Lemme is a 65 y o  female  A very pleasant 63year old female with a PMH of Graves disease (diagnosed in 2018) and hypertension presents to the clinic today for a follow-up on her chronic conditions         The following portions of the patient's history were reviewed and updated as appropriate: allergies, current medications, past family history, past medical history, past social history, past surgical history and problem list     Review of Systems "  Constitutional: Negative for activity change, appetite change, chills and fever  HENT: Negative for sore throat  Respiratory: Negative for cough and shortness of breath  Cardiovascular: Negative for chest pain, palpitations and leg swelling  Gastrointestinal: Negative for abdominal pain, constipation, diarrhea, nausea and vomiting  Musculoskeletal: Negative for back pain and gait problem  Neurological: Negative for dizziness, seizures, weakness and headaches  Objective:      /88 (BP Location: Left arm, Patient Position: Sitting, Cuff Size: Standard)   Pulse 91   Temp 98 5 °F (36 9 °C) (Temporal)   Resp 17   Ht 5' 2\" (1 575 m)   Wt 68 7 kg (151 lb 6 4 oz)   SpO2 98%   BMI 27 69 kg/m²          Physical Exam  Vitals reviewed  Constitutional:       General: She is not in acute distress  Appearance: She is well-developed  She is not diaphoretic  Comments: Quiet voice    HENT:      Head: Normocephalic and atraumatic  Eyes:      Conjunctiva/sclera: Conjunctivae normal    Cardiovascular:      Rate and Rhythm: Normal rate and regular rhythm  Heart sounds: Normal heart sounds  No murmur heard  No friction rub  No gallop  Pulmonary:      Effort: Pulmonary effort is normal  No respiratory distress  Breath sounds: Normal breath sounds  No wheezing or rales  Abdominal:      General: Bowel sounds are normal  There is no distension  Palpations: Abdomen is soft  There is no mass  Tenderness: There is no abdominal tenderness  There is no guarding  Musculoskeletal:         General: No tenderness or deformity  Normal range of motion  Cervical back: Normal range of motion  Right lower leg: No edema  Left lower leg: No edema  Skin:     General: Skin is warm and dry  Neurological:      General: No focal deficit present  Mental Status: She is alert and oriented to person, place, and time           Theo Duarte MD  6/5/2023  "

## 2023-06-05 NOTE — ASSESSMENT & PLAN NOTE
TSH from 4/2023 wnl with mildly decreased free T4 levels  Patient currently on Methimazole 2 5 mg daily  Patient denies palpitations, anxiety, tremor, sweating, heat intolerance, diarrhea, constipation, dyspnea, orthopnea, leg swelling, dysphagia, voice change, appetite change, skin/hair changes, or insomnia  Weight is stable  PHQ-2 today is 0  Patient follows with [facility]  - Continue Methimazole 2 5 mg daily  - Continue with Patanol & Zaditor eye drops PRN  - Follow up in 3 months

## 2023-06-09 DIAGNOSIS — M19.90 ARTHRITIS: Primary | ICD-10-CM

## 2023-06-09 NOTE — RESULT ENCOUNTER NOTE
Called patient to discuss results  After discussion of arthritis and its management, patient would like to try PT so referral was placed

## 2023-06-20 ENCOUNTER — CLINICAL SUPPORT (OUTPATIENT)
Dept: FAMILY MEDICINE CLINIC | Facility: CLINIC | Age: 63
End: 2023-06-20

## 2023-06-20 VITALS
OXYGEN SATURATION: 97 % | DIASTOLIC BLOOD PRESSURE: 86 MMHG | HEART RATE: 114 BPM | TEMPERATURE: 97.6 F | SYSTOLIC BLOOD PRESSURE: 136 MMHG

## 2023-06-20 DIAGNOSIS — I10 ESSENTIAL HYPERTENSION: Primary | ICD-10-CM

## 2023-06-20 DIAGNOSIS — M25.511 RIGHT SHOULDER PAIN, UNSPECIFIED CHRONICITY: ICD-10-CM

## 2023-06-21 RX ORDER — NAPROXEN 500 MG/1
TABLET ORAL
Qty: 30 TABLET | Refills: 0 | Status: SHIPPED | OUTPATIENT
Start: 2023-06-21

## 2023-08-07 DIAGNOSIS — M25.511 RIGHT SHOULDER PAIN, UNSPECIFIED CHRONICITY: ICD-10-CM

## 2023-08-13 DIAGNOSIS — M25.511 ACUTE PAIN OF RIGHT SHOULDER: ICD-10-CM

## 2023-08-13 RX ORDER — NAPROXEN 500 MG/1
500 TABLET ORAL 2 TIMES DAILY WITH MEALS
Qty: 30 TABLET | Refills: 0 | Status: SHIPPED | OUTPATIENT
Start: 2023-08-13

## 2023-08-13 RX ORDER — SENNOSIDES 8.6 MG
650 CAPSULE ORAL EVERY 8 HOURS SCHEDULED
Qty: 90 TABLET | Refills: 2 | Status: SHIPPED | OUTPATIENT
Start: 2023-08-13

## 2023-08-25 DIAGNOSIS — M25.511 RIGHT SHOULDER PAIN, UNSPECIFIED CHRONICITY: ICD-10-CM

## 2023-08-25 RX ORDER — NAPROXEN 500 MG/1
500 TABLET ORAL 2 TIMES DAILY WITH MEALS
Qty: 30 TABLET | Refills: 0 | OUTPATIENT
Start: 2023-08-25

## 2023-09-05 ENCOUNTER — OFFICE VISIT (OUTPATIENT)
Dept: FAMILY MEDICINE CLINIC | Facility: CLINIC | Age: 63
End: 2023-09-05

## 2023-09-05 VITALS
OXYGEN SATURATION: 97 % | WEIGHT: 146 LBS | TEMPERATURE: 98 F | HEIGHT: 62 IN | HEART RATE: 86 BPM | RESPIRATION RATE: 18 BRPM | BODY MASS INDEX: 26.87 KG/M2 | SYSTOLIC BLOOD PRESSURE: 108 MMHG | DIASTOLIC BLOOD PRESSURE: 70 MMHG

## 2023-09-05 DIAGNOSIS — I10 ESSENTIAL HYPERTENSION: ICD-10-CM

## 2023-09-05 DIAGNOSIS — E05.00 GRAVES DISEASE: ICD-10-CM

## 2023-09-05 DIAGNOSIS — M25.511 RIGHT SHOULDER PAIN, UNSPECIFIED CHRONICITY: Primary | ICD-10-CM

## 2023-09-05 PROCEDURE — 99213 OFFICE O/P EST LOW 20 MIN: CPT | Performed by: FAMILY MEDICINE

## 2023-09-05 RX ORDER — IBUPROFEN 600 MG/1
600 TABLET ORAL EVERY 6 HOURS PRN
Qty: 30 TABLET | Refills: 0 | Status: SHIPPED | OUTPATIENT
Start: 2023-09-05

## 2023-09-05 NOTE — ASSESSMENT & PLAN NOTE
Today's BP: 108/70 mmhg . At goal per JNC-8 guidelines. Microalbumin/creatinine and CMP from 2/2023 within normal limits. - Continue Lisinopril 5 mg daily.   -Patient was previously on Atenolol for concurrent Graves disease, which was discontinued due to good control of thyroid.   -F/U in 3 month with PCP

## 2023-09-05 NOTE — ASSESSMENT & PLAN NOTE
Patient follows with [facility] endocrinology. Patient reports Methimazole was discontinued by her endocrinologist due to good thyroid control. She has F/U  with Endocrine in one month   Patient denies palpitations, anxiety, tremor, sweating, heat intolerance, diarrhea, constipation, dyspnea, orthopnea, leg swelling, dysphagia, voice change, appetite change, skin/hair changes, or insomnia. - Continue Zaditor eye drops PRN.    - Follow up in 3 months with PCP   -F/U in one month w/ [facility] Endocr[facility]ology

## 2023-09-05 NOTE — PROGRESS NOTES
Name: Lorene Quinones      : 1960      MRN: [id number]  Encounter Provider: Liv Ferrara MD  Encounter Date: 2023   Encounter department: [address] [facility]     1. Right shoulder pain, unspecified chronicity  Assessment & Plan:  Right shoulder pain that patient states has been present on and off for a few months. Pain usually aggravated when she lifts boxes at work. Pain is not always present and comes and goes. Patient has decreased ROM on abduction of right shoulder- can lift up to 90 degrees passively and without pain- can lift above that but with some pain. X-ray right shoulder : Mild osteoarthritis acromioclavicular joint. - Will continue to monitor.   -Motrin 600 mg q6 hrs prn  -Physical Therapy referral was ordered on last visit. Patient was unaware. Advised to set appointment with PT  -If pain continues with medication and PT consider Corticosteroid injection in a couple of months       Orders:  -     ibuprofen (MOTRIN) 600 mg tablet; Take 1 tablet (600 mg total) by mouth every 6 (six) hours as needed for mild pain or moderate pain    2. Essential hypertension  Assessment & Plan: Today's BP: 108/70 mmhg . At goal per JNC-8 guidelines. Microalbumin/creatinine and CMP from 2023 within normal limits. - Continue Lisinopril 5 mg daily.   -Patient was previously on Atenolol for concurrent Graves disease, which was discontinued due to good control of thyroid. -F/U in 3 month with PCP       3. Graves disease  Assessment & Plan:  Patient follows with [facility] endocrinology. Patient reports Methimazole was discontinued by her endocrinologist due to good thyroid control. She has F/U  with Endocrine in one month   Patient denies palpitations, anxiety, tremor, sweating, heat intolerance, diarrhea, constipation, dyspnea, orthopnea, leg swelling, dysphagia, voice change, appetite change, skin/hair changes, or insomnia. - Continue Zaditor eye drops PRN. - Follow up in 3 months with PCP   -F/U in one month w/ [facility] Endocrinology            Subjective      64 yo female with a pmh of htn, graves and right shoulder pain presents to follow up of these conditions. Reports right shoulder pain has been worsening. Patient advised to get physical therapy and to take Motrin as needed for pain. In the event patient's shoulder pain continues to worsen in the next 3 months after appropriate treatment has been established mutual decision between pcp and patient can be made to try a corticosteroid injection. Patyient acknowledged and agreed. Please see a/p above    Review of Systems   Constitutional: Negative for chills and fever. HENT: Negative for ear pain and sore throat. Eyes: Negative for pain and visual disturbance. Respiratory: Negative for cough and shortness of breath. Cardiovascular: Negative for chest pain and palpitations. Gastrointestinal: Negative for abdominal pain and vomiting. Genitourinary: Negative for dysuria and hematuria. Musculoskeletal: Positive for arthralgias and myalgias. Negative for back pain. Skin: Negative for color change and rash. Neurological: Negative for seizures and syncope. All other systems reviewed and are negative.       Current Outpatient Medications on File Prior to Visit   Medication Sig   • acetaminophen (TYLENOL) 650 mg CR tablet Take 1 tablet (650 mg total) by mouth every 8 (eight) hours   • ARTIFICIAL TEARS 1.4 % ophthalmic solution    • atorvastatin (LIPITOR) 40 mg tablet Take 1 tablet (40 mg total) by mouth daily   • Blood Pressure Monitoring (BLOOD PRESSURE CUFF) MISC by Does not apply route daily   • [employer] ALLERGY RELIEF 10 MG tablet TAKE 1 TABLET BY MOUTH DAILY   • Diclofenac Sodium (VOLTAREN) 1 % Apply 2 g topically 4 (four) times a day   • ferrous sulfate 325 (65 Fe) mg tablet Take 325 mg by mouth   • ketotifen (ZADITOR) 0.025 % ophthalmic solution    • lisinopril (ZESTRIL) 5 mg tablet Take 1 tablet (5 mg total) by mouth daily   • methimazole (TAPAZOLE) 5 mg tablet Take 0.5 tablets (2.5 mg total) by mouth daily   • naproxen (NAPROSYN) 500 mg tablet Take 1 tablet (500 mg total) by mouth 2 (two) times a day with meals   • NATURAL BALANCE TEARS 0.1-0.3 % ophthalmic solution    • olopatadine (PATANOL) 0.1 % ophthalmic solution Apply 1 drop to eye 2 (two) times a day       Objective     /70 (BP Location: Right arm, Patient Position: Sitting, Cuff Size: Standard)   Pulse 86   Temp 98 °F (36.7 °C) (Temporal)   Resp 18   Ht 5' 2" (1.575 m)   Wt 66.2 kg (146 lb)   SpO2 97%   BMI 26.70 kg/m²     Physical Exam  Vitals reviewed. HENT:      Head: Normocephalic. Right Ear: Tympanic membrane normal.      Nose: Nose normal.      Mouth/Throat:      Mouth: Mucous membranes are moist.   Eyes:      Conjunctiva/sclera: Conjunctivae normal.   Cardiovascular:      Rate and Rhythm: Normal rate and regular rhythm. Pulses: Normal pulses. Pulmonary:      Effort: Pulmonary effort is normal.   Abdominal:      General: Bowel sounds are normal.   Musculoskeletal:         General: Normal range of motion. Right shoulder: Tenderness present. Decreased strength. Normal pulse. Left shoulder: No tenderness. Normal strength. Cervical back: Normal range of motion. Comments: ROM limited upon rotation movement of right shoulder. Skin:     Capillary Refill: Capillary refill takes less than 2 seconds. Neurological:      General: No focal deficit present. Mental Status: She is alert.    Psychiatric:         Behavior: Behavior normal.       Oxana King MD

## 2023-09-05 NOTE — ASSESSMENT & PLAN NOTE
Right shoulder pain that patient states has been present on and off for a few months. Pain usually aggravated when she lifts boxes at work. Pain is not always present and comes and goes. Patient has decreased ROM on abduction of right shoulder- can lift up to 90 degrees passively and without pain- can lift above that but with some pain. X-ray right shoulder 6/23: Mild osteoarthritis acromioclavicular joint. - Will continue to monitor.   -Motrin 600 mg q6 hrs prn  -Physical Therapy referral was ordered on last visit. Patient was unaware.  Advised to set appointment with PT  -If pain continues with medication and PT consider Corticosteroid injection in a couple of months

## 2023-10-04 ENCOUNTER — EVALUATION (OUTPATIENT)
Dept: PHYSICAL THERAPY | Facility: CLINIC | Age: 63
End: 2023-10-04
Payer: MEDICARE

## 2023-10-04 VITALS — SYSTOLIC BLOOD PRESSURE: 123 MMHG | DIASTOLIC BLOOD PRESSURE: 75 MMHG

## 2023-10-04 DIAGNOSIS — M19.90 ARTHRITIS: ICD-10-CM

## 2023-10-04 PROCEDURE — 97161 PT EVAL LOW COMPLEX 20 MIN: CPT

## 2023-10-04 PROCEDURE — 97110 THERAPEUTIC EXERCISES: CPT

## 2023-10-04 NOTE — LETTER
2023    Herminia Marino, [address] [address] [place]    Patient: Moe Burrell   YOB: 1960   Date of Visit: 10/4/2023     Encounter Diagnosis     ICD-10-CM    1. Arthritis  M19.90 Ambulatory Referral to Physical Therapy          Dear Dr. Cathie Wallace:    Thank you for your recent referral of Moe Burrell. Please review the attached evaluation summary from Ginna's recent visit. Please verify that you agree with the plan of care by signing the attached order. If you have any questions or concerns, please do not hesitate to call. I sincerely appreciate the opportunity to share in the care of one of your patients and hope to have another opportunity to work with you in the near future. Sincerely,    Candy Hendricks, PT      Referring Provider:      I certify that I have read the below Plan of Care and certify the need for these services furnished under this plan of treatment while under my care. Herminia Marino MD  [address] [facility]  [address] [place]          PT Evaluation     Today's date: 10/4/2023  Patient name: Moe Burrell  : 1960  MRN: [id number]  Referring provider: Herminia Marino MD  Dx:   Encounter Diagnosis     ICD-10-CM    1. Arthritis  M19.90 Ambulatory Referral to Physical Therapy          Start Time: 8055  Stop Time: 1600  Total time in clinic (min): 55 minutes    Assessment  Assessment details: Jimmy Ledezma is a 64 yo female presenting to OP PT secondary to Right shoulder pain with onset of about 1 year, no GERTRUDE. Pt reports functional limitations as follows; difficulty dressing herself, pain with self care such bathing/washing her hair, and discomfort with carrying or lifting items. Pt demonstrates postural deficits in sitting, shoulder AROM deficits, UE strength deficits, and PROM deficits. Per evaluation Jimmy Ledezma presents with signs and sx consistent with RC tendinopathy.  Pt would benefit from skilled PT to address stated deficits and improve return to PLOF. Impairments: abnormal or restricted ROM, impaired physical strength and pain with function  Understanding of Dx/Px/POC: good   Prognosis: good    Goals  Pt will demonstrate [place] with progressive home exercise program to assist with PT carry over and prevent recurrence of symptoms in the future  Pt will demonstrate 20% improvement in FOTO score to increase tolerance to functional return. Pt will demonstrate 20 deg improvement in shoulder ROM to increase tolerance to reaching overhead, behind neck, and behind back to increase ease with ADLs such dressing/bathing  Pt will demonstrate 4+/5 in UE strength to allow for improved tolerance to lifting items overhead. Pt will demonstrate 4+/5 in periscap strength to improve posture in sitting. Plan  Patient would benefit from: PT eval and skilled physical therapy  Planned modality interventions: TENS, thermotherapy: hydrocollator packs, cryotherapy, unattended electrical stimulation and ultrasound  Planned therapy interventions: manual therapy, joint mobilization, IASTM, therapeutic exercise, therapeutic activities, stretching, strengthening, home exercise program, patient education, neuromuscular re-education, Delgado taping and massage  Frequency: 2x week  Duration in weeks: 8  Plan of Care beginning date: 10/4/2023  Plan of Care expiration date: 11/29/2023  Treatment plan discussed with: patient        Subjective Evaluation    History of Present Illness  Mechanism of injury: Right shoulder pain that patient states has been present on and off for over a year with no GERTRUDE. She reports sx are constant particalarly when reaching overhead, putting on shirts/jackets, and fixing hair. Pt additionally reports pain is also aggravated when she lifts boxes at work. She reports some relief with rest and motrin. She denies any numbness or tingling in her arm.    X-ray right shoulder 6/23: Mild osteoarthritis acromioclavicular joint. Recurrent probem    Quality of life: good    Patient Goals  Patient goals for therapy: decreased pain    Pain  Current pain ratin  At best pain ratin  At worst pain rating: 10  Location: [place] lateral aspect of shoulder   Quality: dull ache  Aggravating factors: overhead activity and lifting  Progression: no change    Social Support  Stairs in house: yes   Lives in: multiple-level home  Lives with: spouse and young children    Employment status: working  Hand dominance: right      Diagnostic Tests  X-ray: abnormal  Treatments  Previous treatment: medication        Objective     Palpation   Left   No palpable tenderness to the biceps and upper trapezius. Right   No palpable tenderness to the biceps, subscapularis, supraspinatus, teres major, teres minor and triceps. Active Range of Motion   Left Shoulder   Normal active range of motion    Right Shoulder   Flexion: 85 degrees with pain  Abduction: 75 degrees with pain  External rotation 0°: 50 degrees with pain  Internal rotation 0°: 70 degrees     Passive Range of Motion   Left Shoulder   Normal passive range of motion    Right Shoulder   Flexion: 110 degrees with pain  Abduction: 103 degrees with pain  External rotation 45°: 57 degrees with pain  Internal rotation 0°: 70 degrees     Strength/Myotome Testing     Left Shoulder   Normal muscle strength    Right Shoulder     Planes of Motion   Flexion: 3-   Abduction: 3-   External rotation at 0°: 3   Internal rotation at 0°: 4-     Isolated Muscles   Biceps: 4   Infraspinatus: 3-   Subscapularis: 4   Triceps: 4     Tests     Right Shoulder   Positive full can, Hawkin's and painful arc. Negative drop arm, Neer's, Speed's and Jonah's.      Additional Tests Details  + R infraspinatus test       Flowsheet Rows    Flowsheet Row Most Recent Value   PT/OT G-Codes    Current Score 57   Projected Score 65            Precautions:   Past Medical History: Diagnosis Date   • Graves disease    • Hypertension      Scan       OR     Visit  st"[employer]  Access Code: [email or web address]      Manuals 10/4/23            Right UE PROM NV                                                   Neuro Re-Ed             Ts, Is,  NV            Wall push up plus NV as  jimmie            Serratus press NV                         Ther Ex             Pendulums             Pullies             Scap retract 2x10 HEP            Wand flex, ER 5'', 2x10 ea HEP            Tband rows, ext             Bicep curls              Table flex, scap             Chair flex             Ther Activity                                       Gait Training                                       Modalities             CP 8'

## 2023-10-04 NOTE — PROGRESS NOTES
PT Evaluation     Today's date: 10/4/2023  Patient name: Sana Alejandre  : 1960  MRN: [id number]  Referring provider: Avila Damon MD  Dx:   Encounter Diagnosis     ICD-10-CM    1. Arthritis  M19.90 Ambulatory Referral to Physical Therapy          Start Time: 930  Stop Time: 1600  Total time in clinic (min): 55 minutes    Assessment  Assessment details: Saroj Warner is a 66 yo female presenting to OP PT secondary to Right shoulder pain with onset of about 1 year, no GERTRUDE. Pt reports functional limitations as follows; difficulty dressing herself, pain with self care such bathing/washing her hair, and discomfort with carrying or lifting items. Pt demonstrates postural deficits in sitting, shoulder AROM deficits, UE strength deficits, and PROM deficits. Per evaluation Saroj Warner presents with signs and sx consistent with RC tendinopathy. Pt would benefit from skilled PT to address stated deficits and improve return to PLOF. Impairments: abnormal or restricted ROM, impaired physical strength and pain with function  Understanding of Dx/Px/POC: good   Prognosis: good    Goals  Pt will demonstrate [place] with progressive home exercise program to assist with PT carry over and prevent recurrence of symptoms in the future  Pt will demonstrate 20% improvement in FOTO score to increase tolerance to functional return. Pt will demonstrate 20 deg improvement in shoulder ROM to increase tolerance to reaching overhead, behind neck, and behind back to increase ease with ADLs such dressing/bathing  Pt will demonstrate 4+/5 in UE strength to allow for improved tolerance to lifting items overhead. Pt will demonstrate 4+/5 in periscap strength to improve posture in sitting.       Plan  Patient would benefit from: PT eval and skilled physical therapy  Planned modality interventions: TENS, thermotherapy: hydrocollator packs, cryotherapy, unattended electrical stimulation and ultrasound  Planned therapy interventions: manual therapy, joint mobilization, IASTM, therapeutic exercise, therapeutic activities, stretching, strengthening, home exercise program, patient education, neuromuscular re-education, Delgado taping and massage  Frequency: 2x week  Duration in weeks: 8  Plan of Care beginning date: 10/4/2023  Plan of Care expiration date: 2023  Treatment plan discussed with: patient        Subjective Evaluation    History of Present Illness  Mechanism of injury: Right shoulder pain that patient states has been present on and off for over a year with no GERTRUDE. She reports sx are constant particalarly when reaching overhead, putting on shirts/jackets, and fixing hair. Pt additionally reports pain is also aggravated when she lifts boxes at work. She reports some relief with rest and motrin. She denies any numbness or tingling in her arm. X-ray right shoulder : Mild osteoarthritis acromioclavicular joint. Recurrent probem    Quality of life: good    Patient Goals  Patient goals for therapy: decreased pain    Pain  Current pain ratin  At best pain ratin  At worst pain rating: 10  Location: [place] lateral aspect of shoulder   Quality: dull ache  Aggravating factors: overhead activity and lifting  Progression: no change    Social Support  Stairs in house: yes   Lives in: multiple-level home  Lives with: spouse and young children    Employment status: working  Hand dominance: right      Diagnostic Tests  X-ray: abnormal  Treatments  Previous treatment: medication        Objective     Palpation   Left   No palpable tenderness to the biceps and upper trapezius. Right   No palpable tenderness to the biceps, subscapularis, supraspinatus, teres major, teres minor and triceps.      Active Range of Motion   Left Shoulder   Normal active range of motion    Right Shoulder   Flexion: 85 degrees with pain  Abduction: 75 degrees with pain  External rotation 0°: 50 degrees with pain  Internal rotation 0°: 70 degrees Passive Range of Motion   Left Shoulder   Normal passive range of motion    Right Shoulder   Flexion: 110 degrees with pain  Abduction: 103 degrees with pain  External rotation 45°: 57 degrees with pain  Internal rotation 0°: 70 degrees     Strength/Myotome Testing     Left Shoulder   Normal muscle strength    Right Shoulder     Planes of Motion   Flexion: 3-   Abduction: 3-   External rotation at 0°: 3   Internal rotation at 0°: 4-     Isolated Muscles   Biceps: 4   Infraspinatus: 3-   Subscapularis: 4   Triceps: 4     Tests     Right Shoulder   Positive full can, Hawkin's and painful arc. Negative drop arm, Neer's, Speed's and Jonah's. Additional Tests Details  + R infraspinatus test       Flowsheet Rows    Flowsheet Row Most Recent Value   PT/OT G-Codes    Current Score 57   Projected Score 65             Precautions:   Past Medical History:   Diagnosis Date   • Graves disease    • Hypertension      Scan       OR     Visit  [email or web address]  Access Code: [email or web address]      Manuals 10/4/23            Right UE PROM NV                                                   Neuro Re-Ed             Ts, Is,  NV            Wall push up plus NV as  jimmie            Serratus press NV                         Ther Ex             Pendulums             Pullies             Scap retract 2x10 HEP            Wand flex, ER 5'', 2x10 ea HEP            Tband rows, ext             Bicep curls              Table flex, scap             Chair flex             Ther Activity                                       Gait Training                                       Modalities             CP 8'

## 2023-10-10 ENCOUNTER — OFFICE VISIT (OUTPATIENT)
Dept: PHYSICAL THERAPY | Facility: CLINIC | Age: 63
End: 2023-10-10
Payer: MEDICARE

## 2023-10-10 DIAGNOSIS — M19.90 ARTHRITIS: Primary | ICD-10-CM

## 2023-10-10 PROCEDURE — 97110 THERAPEUTIC EXERCISES: CPT

## 2023-10-10 NOTE — PROGRESS NOTES
Daily Note     Today's date: 10/10/2023  Patient name: Baylee Gibson  : 1960  MRN: [id number]  Referring provider: Antonio Love MD  Dx:   Encounter Diagnosis     ICD-10-CM    1. Arthritis  M19.90           Start Time: 730  Stop Time: 801  Total time in clinic (min): 31 minutes    Subjective: Pt reports pain persists today, and denies any changes since her last session. Objective: See treatment diary below      Assessment: Tolerated treatment well. Pt is given significant cuing throughout session on reps/sets. She is also introduced to new exercises this session to add to program. She has good overall tolerance  Patient demonstrated fatigue post treatment, exhibited good technique with therapeutic exercises and would benefit from continued PT      Plan: Continue per plan of care. Precautions:   Past Medical History:   Diagnosis Date   • Graves disease    • Hypertension      Scan       OR     Visit  [email or web address]  Access Code: [email or web address]      Manuals 10/4/23 10/10           Right UE PROM NV FH           Post cuff STM.  UT STM  FH           Mobilizations R shoulder  FH grade 2                        Neuro Re-Ed             Ts, Is,  NV            Wall push up plus NV as  jimmie            Serratus press NV 2x10 needs cuing                        Ther Ex             Pendulums  x20 Circles           Pullies  3'           Scap retract 2x10 HEP 2x10           Wand flex, ER 5'', 2x10 ea HEP 5'', 2x10           Tband rows, ext             Bicep curls   2x10           Table flex, scap  NV           S/l ER  x15           S/l abd  x15                        Chair flex             Ther Activity                                       Gait Training                                       Modalities             CP 8'

## 2023-10-12 ENCOUNTER — APPOINTMENT (OUTPATIENT)
Dept: PHYSICAL THERAPY | Facility: CLINIC | Age: 63
End: 2023-10-12
Payer: MEDICARE

## 2023-10-17 ENCOUNTER — OFFICE VISIT (OUTPATIENT)
Dept: PHYSICAL THERAPY | Facility: CLINIC | Age: 63
End: 2023-10-17
Payer: MEDICARE

## 2023-10-17 DIAGNOSIS — M19.90 ARTHRITIS: Primary | ICD-10-CM

## 2023-10-17 PROCEDURE — 97110 THERAPEUTIC EXERCISES: CPT

## 2023-10-17 NOTE — PROGRESS NOTES
Daily Note     Today's date: 10/17/2023  Patient name: Geraldine Childs  : 1960  MRN: [id number]  Referring provider: Violet Rodriguez MD  Dx:   Encounter Diagnosis     ICD-10-CM    1. Arthritis  M19.90           Start Time: 114  Stop Time: 930  Total time in clinic (min): 40 minutes    Subjective: Tad Jorgensen notes improvement in shoulder sx since her last visit. She denies any medical changes since her last visit. Objective: See treatment diary below      Assessment: Tolerated treatment well. She reports no sx aggravation during treatment session and was able to tolerate mild progressions in reps. Pt was introduced to [facility] today and was able to complete without increase in sx. Patient demonstrated fatigue post treatment, exhibited good technique with therapeutic exercises, and would benefit from continued PT      Plan: Continue per plan of care. Precautions:   Past Medical History:   Diagnosis Date    Graves disease     Hypertension      Scan       OR     Visit  [email or web address]  Access Code: [email or web address]      Manuals 10/4/23 10/10 10/17          Right UE PROM NV FH FH          Post cuff STM.  UT STM  FH FH          Mobilizations R shoulder  FH grade 2 FH grade 2                        Neuro Re-Ed             Ts, Is,  NV            Wall push up plus NV as  jimmie            Serratus press NV 2x10 needs cuing 2x10                       Ther Ex             Pendulums  x20 Circles x20          Pullies  3' 3'          Scap retract 2x10 HEP 2x10 2x10          Wand flex, ER 5'', 2x10 ea HEP 5'', 2x10 5'' 2x10          Tband rows, ext             Bicep curls   2x10 2x10          Table flex, scap  NV Wall slides 5''x10 ea          S/l ER  x15 x15          S/l abd  x15 x15                       Chair flex             Ther Activity                                       Gait Training                                       Modalities             CP 8'  8'

## 2023-10-24 ENCOUNTER — OFFICE VISIT (OUTPATIENT)
Dept: PHYSICAL THERAPY | Facility: CLINIC | Age: 63
End: 2023-10-24
Payer: MEDICARE

## 2023-10-24 DIAGNOSIS — M19.90 ARTHRITIS: Primary | ICD-10-CM

## 2023-10-24 PROCEDURE — 97110 THERAPEUTIC EXERCISES: CPT

## 2023-10-24 PROCEDURE — 97140 MANUAL THERAPY 1/> REGIONS: CPT

## 2023-10-24 NOTE — PROGRESS NOTES
Daily Note     Today's date: 10/24/2023  Patient name: Ant Gloria  : 1960  MRN: [id number]  Referring provider: Brooklyn Schneider MD  Dx:   Encounter Diagnosis     ICD-10-CM    1. Arthritis  M19.90           Start Time: 1130  Stop Time: 4500  Total time in clinic (min): 35 minutes    Subjective: Notes that she is having continued trouble reaching above her head. Notes that the ice at the end of the session helps a good amount. Felt pretty after good last time. Objective: See treatment diary below      Assessment: Tolerated treatment well. Noticeable stiffness/irritability with IR, flexion, abduction of R shoulder during PROM. Able to achieve roughly 150 degrees of motion with AAROM with wand into flexion. Decreased strength and motor control evident during scapular stabilizing exercises. Good relief with cryotherapy and manual therapy with improved motion post session Patient demonstrated fatigue post treatment, exhibited good technique with therapeutic exercises, and would benefit from continued PT      Plan: Continue per plan of care. Progress treatment as tolerated. May benefit from rows/ext NV. Thoracic mobility? Precautions:   Past Medical History:   Diagnosis Date    Graves disease     Hypertension      Scan       OR     Visit  [email or web address]  Access Code: [email or web address]      Manuals 10/4/23 10/10 10/17 10/24         Right UE PROM NV FH FH MH          Post cuff STM.  UT STM  FH FH MH          Mobilizations R shoulder  FH grade 2 FH grade 2  MH II-III                      Neuro Re-Ed             Ts, Is,  NV            Wall push up plus NV as  jimmie            Serratus press NV 2x10 needs cuing 2x10 2x10 no resist                      Ther Ex             Pendulums  x20 Circles x20 X20 CW/CCW         Pullies  3' 3' 3'          Scap retract 2x10 HEP 2x10 2x10          Wand flex, ER 5'', 2x10 ea HEP 5'', 2x10 5'' 2x10 5" 2x10          Tband rows, ext    NV          Bicep curls   2x10 2x10 NT          Table flex, scap  NV Wall slides 5''x10 ea Wall slides 5" x10 ea         S/l ER  x15 x15 x15         S/l abd  x15 x15 x15                      Chair flex             Ther Activity                                       Gait Training                                       Modalities             CP 8'  8' 10'

## 2023-10-26 ENCOUNTER — OFFICE VISIT (OUTPATIENT)
Dept: PHYSICAL THERAPY | Facility: CLINIC | Age: 63
End: 2023-10-26
Payer: MEDICARE

## 2023-10-26 DIAGNOSIS — M19.90 ARTHRITIS: Primary | ICD-10-CM

## 2023-10-26 PROCEDURE — 97110 THERAPEUTIC EXERCISES: CPT

## 2023-10-26 PROCEDURE — 97112 NEUROMUSCULAR REEDUCATION: CPT

## 2023-10-26 NOTE — PROGRESS NOTES
Daily Note     Today's date: 10/26/2023  Patient name: Gil Lung  : 1960  MRN: [id number]  Referring provider: Ayala Franco MD  Dx:   Encounter Diagnosis     ICD-10-CM    1. Arthritis  M19.90           Start Time: 0830  Stop Time: 0900  Total time in clinic (min): 30 minutes    Subjective: Pt denies any pain at the start of her session. She reports no medical changes       Objective: See treatment diary below      Assessment: Pt tolerated treatment well with consistent cuing throughout. TE's were performed with increased reps and increased resistance. New TE's were demonstrated with proper technique, and tolerated well. Following treatment, the patient demonstrated fatigue post treatment, exhibited good technique with therapeutic exercises, and would benefit from continued PT.   \      Plan: Continue per plan of care. Precautions:   Past Medical History:   Diagnosis Date    Graves disease     Hypertension      Scan       OR     Visit  [email or web address]  Access Code: [email or web address]      Manuals 10/4/23 10/10 10/17 10/24 10/26        Right UE PROM NV FH FH MH          Post cuff STM. UT STM  FH FH MH  FH        Mobilizations R shoulder  FH grade 2 FH grade 2  MH II-III                      Neuro Re-Ed             Ts, Is,  NV            Wall push up plus NV as  jimmie            Serratus press NV 2x10 needs cuing 2x10 2x10 no resist 2x10                      Ther Ex             Pendulums  x20 Circles x20 X20 CW/CCW D/C HEP        Pullies  3' 3' 3'  NP        Scap retract 2x10 HEP 2x10 2x10  2x10 with manual resistance        Wand flex, ER 5'', 2x10 ea HEP 5'', 2x10 5'' 2x10 5" 2x10  Flex/scap standin x15, ER 2x10        Tband rows, ext    NV  GTB 2x10        Bicep curls   2x10 2x10 NT  2# 2x10        Table flex, scap  NV Wall slides 5''x10 ea Wall slides 5" x10 ea Wall slides 5" x10 ea        S/l ER  x15 x15 x15 2x10 add # NV?         S/l abd  x15 x15 x15 2x10        ER/IR strengthening     Jack 2x10 ea        Chair flex             Ther Activity                                       Gait Training                                       Modalities             CP 8'  8' 10'

## 2023-11-27 DIAGNOSIS — I10 ESSENTIAL HYPERTENSION: ICD-10-CM

## 2023-11-28 RX ORDER — LISINOPRIL 5 MG/1
5 TABLET ORAL DAILY
Qty: 90 TABLET | Refills: 1 | Status: SHIPPED | OUTPATIENT
Start: 2023-11-28

## 2023-12-04 NOTE — PROGRESS NOTES
Name: Sana Alejandre      : 1960      MRN: [id number]  Encounter Provider: Beatris Boston MD  Encounter Date: 2023   Encounter department: [address] [facility]     1. Graves disease  Assessment & Plan:  Patient follows with [facility] endocrinology. Methimazole was discontinued by her endocrinologist due to good thyroid control.    - Follow up in 3 months with PCP   -F/U w/ [facility] Endocrinology in 2024  - TSH, T4, T3    Orders:  -     TSH + Free T4; Future  -     T3, free; Future    2. Chronic right shoulder pain  Assessment & Plan:  Chronic right shoulder pain  X-ray right shoulder : Mild osteoarthritis acromioclavicular joint. - Will continue to monitor.   -Motrin 600 mg q6 hrs prn; Tylenol 650mg Q8 PRN  -Follows with Physical Therapy  -If pain continues with medication and PT consider Corticosteroid injection. Patient to decide. 3. Essential hypertension  Assessment & Plan:  BP Readings from Last 3 Encounters:   23 128/76   10/04/23 123/75   23 108/70       At goal per JNC-8 guidelines. Microalbumin/creatinine and CMP from 2023 within normal limits. - Continue Lisinopril 5 mg daily.   -Patient was previously on Atenolol for concurrent Graves disease, which was discontinued due to good control of thyroid. -F/U in 3 month with PCP       4. Mixed hyperlipidemia  Assessment & Plan:  Lipid Panel from 2023 showed mildly elevated LDL levels. - Continue Lipitor 40 mg daily. Orders:  -     atorvastatin (LIPITOR) 40 mg tablet; Take 1 tablet (40 mg total) by mouth daily    5. Encounter for immunization  -     influenza vaccine, quadrivalent, 0.5 mL, preservative-free, for adult and pediatric patients 6 mos+ (AFLURIA, FLUARIX, FLULAVAL, FLUZONE)    6. Colon cancer screening  -     Ambulatory Referral to Gastroenterology;  Future           Subjective      Sana Alejandre is a 65 y.o. female with a pmh of htn, graves and right shoulder pain presents to follow up of these conditions. Reports right shoulder pain stayed the same. She states she currently needs to try physical therapy and use NSAIDs for pain relief. Discussed with patient that there is an option for corticosteroid injection but she chooses to think about that and return to clinic if she decides to try that. No ED visits or hospital admissions since last visit. All other symptoms endorsed or denied per ROS. Review of Systems   Constitutional:  Negative for chills, fatigue and fever. HENT:  Negative for congestion, rhinorrhea and sore throat. Eyes: Negative. Respiratory:  Negative for cough and shortness of breath. Cardiovascular:  Negative for chest pain and palpitations. Gastrointestinal: Negative. Endocrine: Negative. Genitourinary: Negative. Musculoskeletal:  Positive for arthralgias. Negative for gait problem and joint swelling. Neurological: Negative. Current Outpatient Medications on File Prior to Visit   Medication Sig    acetaminophen (TYLENOL) 650 mg CR tablet Take 1 tablet (650 mg total) by mouth every 8 (eight) hours    [employer] ALLERGY RELIEF 10 MG tablet TAKE 1 TABLET BY MOUTH DAILY    Diclofenac Sodium (VOLTAREN) 1 % Apply 2 g topically 4 (four) times a day    ferrous sulfate 325 (65 Fe) mg tablet Take 325 mg by mouth    ibuprofen (MOTRIN) 600 mg tablet Take 1 tablet (600 mg total) by mouth every 6 (six) hours as needed for mild pain or moderate pain    lisinopril (ZESTRIL) 5 mg tablet TAKE 1 TABLET (5 MG TOTAL) BY MOUTH DAILY.     [DISCONTINUED] atorvastatin (LIPITOR) 40 mg tablet Take 1 tablet (40 mg total) by mouth daily    ARTIFICIAL TEARS 1.4 % ophthalmic solution     Blood Pressure Monitoring (BLOOD PRESSURE CUFF) MISC by Does not apply route daily    olopatadine (PATANOL) 0.1 % ophthalmic solution Apply 1 drop to eye 2 (two) times a day    [DISCONTINUED] ketotifen (ZADITOR) 0.025 % ophthalmic solution [DISCONTINUED] methimazole (TAPAZOLE) 5 mg tablet Take 0.5 tablets (2.5 mg total) by mouth daily    [DISCONTINUED] naproxen (NAPROSYN) 500 mg tablet Take 1 tablet (500 mg total) by mouth 2 (two) times a day with meals    [DISCONTINUED] NATURAL BALANCE TEARS 0.1-0.3 % ophthalmic solution        Objective     /76   Pulse 95   Temp 98.8 °F (37.1 °C) (Temporal)   Resp 16   Wt 67.1 kg (148 lb)   SpO2 96%   BMI 27.07 kg/m²     Physical Exam  Vitals reviewed. Constitutional:       General: She is not in acute distress. Appearance: Normal appearance. Comments: Quiet Voice   HENT:      Head: Normocephalic. Nose: Nose normal.      Mouth/Throat:      Mouth: Mucous membranes are moist.   Eyes:      Extraocular Movements: Extraocular movements intact. Conjunctiva/sclera: Conjunctivae normal.   Cardiovascular:      Rate and Rhythm: Normal rate and regular rhythm. Pulses: Normal pulses. Pulmonary:      Effort: Pulmonary effort is normal.   Abdominal:      General: Bowel sounds are normal.   Musculoskeletal:      Right shoulder: Tenderness present. Decreased strength. Normal pulse. Left shoulder: No tenderness. Normal strength. Cervical back: Normal range of motion. Comments: ROM limited upon rotation movement of right shoulder. Skin:     General: Skin is warm. Capillary Refill: Capillary refill takes less than 2 seconds. Neurological:      General: No focal deficit present. Mental Status: She is alert and oriented to person, place, and time.    Psychiatric:         Behavior: Behavior normal.       Adrienne Marin MD

## 2023-12-06 ENCOUNTER — LAB (OUTPATIENT)
Dept: LAB | Facility: CLINIC | Age: 63
End: 2023-12-06
Payer: MEDICARE

## 2023-12-06 ENCOUNTER — OFFICE VISIT (OUTPATIENT)
Dept: FAMILY MEDICINE CLINIC | Facility: CLINIC | Age: 63
End: 2023-12-06

## 2023-12-06 VITALS
BODY MASS INDEX: 27.07 KG/M2 | WEIGHT: 148 LBS | DIASTOLIC BLOOD PRESSURE: 76 MMHG | HEART RATE: 95 BPM | SYSTOLIC BLOOD PRESSURE: 128 MMHG | RESPIRATION RATE: 16 BRPM | TEMPERATURE: 98.8 F | OXYGEN SATURATION: 96 %

## 2023-12-06 DIAGNOSIS — I10 ESSENTIAL HYPERTENSION: ICD-10-CM

## 2023-12-06 DIAGNOSIS — E05.00 GRAVES DISEASE: Primary | ICD-10-CM

## 2023-12-06 DIAGNOSIS — M25.511 CHRONIC RIGHT SHOULDER PAIN: ICD-10-CM

## 2023-12-06 DIAGNOSIS — Z12.11 COLON CANCER SCREENING: ICD-10-CM

## 2023-12-06 DIAGNOSIS — E78.2 MIXED HYPERLIPIDEMIA: ICD-10-CM

## 2023-12-06 DIAGNOSIS — Z23 ENCOUNTER FOR IMMUNIZATION: ICD-10-CM

## 2023-12-06 DIAGNOSIS — E05.00 GRAVES DISEASE: ICD-10-CM

## 2023-12-06 DIAGNOSIS — G89.29 CHRONIC RIGHT SHOULDER PAIN: ICD-10-CM

## 2023-12-06 LAB
T3FREE SERPL-MCNC: 3.43 PG/ML (ref 2.5–3.9)
T4 FREE SERPL-MCNC: 0.67 NG/DL (ref 0.61–1.12)
TSH SERPL DL<=0.05 MIU/L-ACNC: 0.96 UIU/ML (ref 0.45–4.5)

## 2023-12-06 PROCEDURE — 84481 FREE ASSAY (FT-3): CPT

## 2023-12-06 PROCEDURE — 99213 OFFICE O/P EST LOW 20 MIN: CPT | Performed by: FAMILY MEDICINE

## 2023-12-06 PROCEDURE — 90686 IIV4 VACC NO PRSV 0.5 ML IM: CPT | Performed by: FAMILY MEDICINE

## 2023-12-06 PROCEDURE — 84439 ASSAY OF FREE THYROXINE: CPT

## 2023-12-06 PROCEDURE — 84443 ASSAY THYROID STIM HORMONE: CPT

## 2023-12-06 PROCEDURE — 90471 IMMUNIZATION ADMIN: CPT | Performed by: FAMILY MEDICINE

## 2023-12-06 PROCEDURE — 36415 COLL VENOUS BLD VENIPUNCTURE: CPT

## 2023-12-06 RX ORDER — ATORVASTATIN CALCIUM 40 MG/1
40 TABLET, FILM COATED ORAL DAILY
Qty: 30 TABLET | Refills: 5 | Status: SHIPPED | OUTPATIENT
Start: 2023-12-06

## 2023-12-06 NOTE — ASSESSMENT & PLAN NOTE
Chronic right shoulder pain  X-ray right shoulder 6/23: Mild osteoarthritis acromioclavicular joint. - Will continue to monitor.   -Motrin 600 mg q6 hrs prn; Tylenol 650mg Q8 PRN  -Follows with Physical Therapy  -If pain continues with medication and PT consider Corticosteroid injection. Patient to decide.

## 2023-12-06 NOTE — ASSESSMENT & PLAN NOTE
Patient follows with [facility] endocrinology.   Methimazole was discontinued by her endocrinologist due to good thyroid control.    - Follow up in 3 months with PCP   -F/U w/ [facility] Endocrinology in 2/2024  - TSH, T4, T3

## 2023-12-06 NOTE — ASSESSMENT & PLAN NOTE
BP Readings from Last 3 Encounters:   12/06/23 128/76   10/04/23 123/75   09/05/23 108/70       At goal per JNC-8 guidelines. Microalbumin/creatinine and CMP from 2/2023 within normal limits. - Continue Lisinopril 5 mg daily.   -Patient was previously on Atenolol for concurrent Graves disease, which was discontinued due to good control of thyroid.   -F/U in 3 month with PCP

## 2023-12-21 ENCOUNTER — PREP FOR PROCEDURE (OUTPATIENT)
Age: 63
End: 2023-12-21

## 2023-12-21 ENCOUNTER — TELEPHONE (OUTPATIENT)
Age: 63
End: 2023-12-21

## 2023-12-21 DIAGNOSIS — Z12.11 SCREENING FOR COLON CANCER: Primary | ICD-10-CM

## 2023-12-21 NOTE — TELEPHONE ENCOUNTER
12/21/23  Screened by: Rhea Lang     Referring Provider Allan Olmos      Pre- Screening:      Body mass index is 27.07 kg/m².  Has patient been referred for a routine screening Colonoscopy? yes  Is the patient between 45-75 years old? yes        Previous Colonoscopy no   If yes:    Date:     Facility:     Reason:         SCHEDULING STAFF: If the patient is between 45yrs-49yrs, please advise patient to confirm benefits/coverage with their insurance company for a routine screening colonoscopy, some insurance carriers will only cover at 50yrs or older. If the patient is over 75years old, please schedule an office visit.      Does the patient want to see a Gastroenterologist prior to their procedure OR are they having any GI symptoms? no     Has the patient been hospitalized or had abdominal surgery in the past 6 months? no     Does the patient use supplemental oxygen? no     Does the patient take Coumadin, Lovenox, Plavix, Elliquis, Xarelto, or other blood thinning medication? no     Has the patient had a stroke, cardiac event, or stent placed in the past year? no     SCHEDULING STAFF: If patient answers NO to above questions, then schedule procedure. If patient answers YES to above questions, then schedule office appointment.      If patient is between 45yrs - 49yrs, please advise patient that we will have to confirm benefits & coverage with their insurance company for a routine screening colonoscopy.

## 2023-12-21 NOTE — TELEPHONE ENCOUNTER
Scheduled date of colonoscopy (as of today):1/4/2024  Physician performing colonoscopy:   Location of colonoscopy: [place]  Bowel prep reviewed with patient: Deanna Cleaning  Instructions reviewed with patient by: Deanna Cleaning  Clearances: N/A    Miralax Dulcolax prep sent via [employer]

## 2023-12-29 ENCOUNTER — TELEPHONE (OUTPATIENT)
Dept: GASTROENTEROLOGY | Facility: CLINIC | Age: 63
End: 2023-12-29

## 2023-12-29 ENCOUNTER — APPOINTMENT (OUTPATIENT)
Dept: LAB | Facility: HOSPITAL | Age: 63
End: 2023-12-29
Payer: MEDICARE

## 2023-12-29 DIAGNOSIS — E05.00 BASEDOW'S DISEASE: Primary | ICD-10-CM

## 2023-12-29 LAB — TSH SERPL DL<=0.05 MIU/L-ACNC: 1.17 UIU/ML (ref 0.45–4.5)

## 2023-12-29 PROCEDURE — 84443 ASSAY THYROID STIM HORMONE: CPT

## 2024-01-03 RX ORDER — SODIUM CHLORIDE 9 MG/ML
125 INJECTION, SOLUTION INTRAVENOUS CONTINUOUS
Status: CANCELLED | OUTPATIENT
Start: 2024-01-03

## 2024-01-04 ENCOUNTER — ANESTHESIA EVENT (OUTPATIENT)
Dept: GASTROENTEROLOGY | Facility: HOSPITAL | Age: 64
End: 2024-01-04

## 2024-01-04 ENCOUNTER — HOSPITAL ENCOUNTER (OUTPATIENT)
Dept: GASTROENTEROLOGY | Facility: HOSPITAL | Age: 64
Setting detail: OUTPATIENT SURGERY
End: 2024-01-04
Attending: INTERNAL MEDICINE
Payer: MEDICARE

## 2024-01-04 ENCOUNTER — ANESTHESIA (OUTPATIENT)
Dept: GASTROENTEROLOGY | Facility: HOSPITAL | Age: 64
End: 2024-01-04

## 2024-01-04 VITALS
RESPIRATION RATE: 16 BRPM | HEART RATE: 66 BPM | OXYGEN SATURATION: 97 % | DIASTOLIC BLOOD PRESSURE: 59 MMHG | SYSTOLIC BLOOD PRESSURE: 107 MMHG

## 2024-01-04 DIAGNOSIS — Z12.11 SCREENING FOR COLON CANCER: ICD-10-CM

## 2024-01-04 PROCEDURE — 45385 COLONOSCOPY W/LESION REMOVAL: CPT | Performed by: INTERNAL MEDICINE

## 2024-01-04 PROCEDURE — 45380 COLONOSCOPY AND BIOPSY: CPT | Performed by: INTERNAL MEDICINE

## 2024-01-04 PROCEDURE — 88305 TISSUE EXAM BY PATHOLOGIST: CPT | Performed by: PATHOLOGY

## 2024-01-04 RX ORDER — PROPOFOL 10 MG/ML
INJECTION, EMULSION INTRAVENOUS AS NEEDED
Status: DISCONTINUED | OUTPATIENT
Start: 2024-01-04 | End: 2024-01-04

## 2024-01-04 RX ORDER — SODIUM CHLORIDE 9 MG/ML
125 INJECTION, SOLUTION INTRAVENOUS CONTINUOUS
Status: DISPENSED | OUTPATIENT
Start: 2024-01-04

## 2024-01-04 RX ADMIN — PROPOFOL 30 MG: 10 INJECTION, EMULSION INTRAVENOUS at 08:56

## 2024-01-04 RX ADMIN — PROPOFOL 30 MG: 10 INJECTION, EMULSION INTRAVENOUS at 08:54

## 2024-01-04 RX ADMIN — PROPOFOL 30 MG: 10 INJECTION, EMULSION INTRAVENOUS at 08:59

## 2024-01-04 RX ADMIN — PROPOFOL 20 MG: 10 INJECTION, EMULSION INTRAVENOUS at 08:58

## 2024-01-04 RX ADMIN — SODIUM CHLORIDE 125 ML/HR: 0.9 INJECTION, SOLUTION INTRAVENOUS at 08:44

## 2024-01-04 RX ADMIN — PROPOFOL 30 MG: 10 INJECTION, EMULSION INTRAVENOUS at 09:01

## 2024-01-04 RX ADMIN — PROPOFOL 120 MG: 10 INJECTION, EMULSION INTRAVENOUS at 08:52

## 2024-01-04 NOTE — H&P
History and Physical -  [facility]  Ginna Mcmahon 63 y.o. female MRN: [id number]                  HPI: Ginna Mcmahon is a 63 y.o. year old female who presents for colon cancer screening.      REVIEW OF SYSTEMS: Per the HPI, and otherwise unremarkable.    Historical Information   Past Medical History:   Diagnosis Date    Graves disease     Hypertension      History reviewed. No pertinent surgical history.  Social History   Social History     Substance and Sexual Activity   Alcohol Use No     Social History     Substance and Sexual Activity   Drug Use No     Social History     Tobacco Use   Smoking Status Never    Passive exposure: Never   Smokeless Tobacco Never     Family History   Problem Relation Age of Onset    No Known Problems Mother     No Known Problems Father     No Known Problems Sister     No Known Problems Daughter     No Known Problems Maternal Grandmother     No Known Problems Maternal Grandfather     No Known Problems Paternal Grandmother     No Known Problems Paternal Grandfather        Meds/Allergies       Current Outpatient Medications:     atorvastatin (LIPITOR) 40 mg tablet    ferrous sulfate 325 (65 Fe) mg tablet    lisinopril (ZESTRIL) 5 mg tablet    olopatadine (PATANOL) 0.1 % ophthalmic solution    acetaminophen (TYLENOL) 650 mg CR tablet    ARTIFICIAL TEARS 1.4 % ophthalmic solution    Blood Pressure Monitoring (BLOOD PRESSURE CUFF) MISC    [employer] ALLERGY RELIEF 10 MG tablet    Diclofenac Sodium (VOLTAREN) 1 %    ibuprofen (MOTRIN) 600 mg tablet    Current Facility-Administered Medications:     sodium chloride 0.9 % infusion, 125 mL/hr, Intravenous, Continuous    No Known Allergies    Objective     /76   Pulse 79   Resp 15   SpO2 99%       PHYSICAL EXAM    Gen: NAD  Head: NCAT  CV: RRR  CHEST: Clear  ABD: soft, NT/ND  EXT: no edema      ASSESSMENT/PLAN:  This is a 63 y.o. year old female here for colonoscopy, and she is stable and optimized for her  procedure.

## 2024-01-04 NOTE — ANESTHESIA POSTPROCEDURE EVALUATION
Post-Op Assessment Note    CV Status:  Stable    Pain management: adequate       Mental Status:  Alert and awake   Hydration Status:  Euvolemic   PONV Controlled:  Controlled   Airway Patency:  Patent     Post Op Vitals Reviewed: Yes      Staff: Anesthesiologist, CRNA               BP      Temp      Pulse     Resp      SpO2      /59   Pulse 66   Resp 16   SpO2 97%

## 2024-01-04 NOTE — ANESTHESIA PREPROCEDURE EVALUATION
Procedure:  COLONOSCOPY    Relevant Problems   CARDIO   (+) Essential hypertension   (+) Hyperlipidemia        Physical Exam    Airway    Mallampati score: II    Neck ROM: full     Dental   No notable dental hx     Cardiovascular  Rhythm: regular, Rate: normal, Cardiovascular exam normal    Pulmonary  Pulmonary exam normal Breath sounds clear to auscultation    Other Findings  post-pubertal.      Anesthesia Plan  ASA Score- 2     Anesthesia Type- IV sedation with anesthesia with ASA Monitors.         Additional Monitors:     Airway Plan:            Plan Factors-    Chart reviewed.    Patient summary reviewed.    Patient is not a current smoker. Patient not instructed to abstain from smoking on day of procedure. Patient did not smoke on day of surgery.            Induction- intravenous.    Postoperative Plan-     Informed Consent- Anesthetic plan and risks discussed with patient.  I personally reviewed this patient with the CRNA. Discussed and agreed on the Anesthesia Plan with the CRNA..

## 2024-01-08 PROCEDURE — 88305 TISSUE EXAM BY PATHOLOGIST: CPT | Performed by: PATHOLOGY

## 2024-01-08 NOTE — RESULT ENCOUNTER NOTE
Polyps were adenomas (precancerous but no cancer) so repeat colonoscopy in 3 years. This was communicated via [employer]

## 2024-02-12 ENCOUNTER — TELEPHONE (OUTPATIENT)
Dept: FAMILY MEDICINE CLINIC | Facility: CLINIC | Age: 64
End: 2024-02-12

## 2024-02-12 NOTE — TELEPHONE ENCOUNTER
MEDICATION ASSESSMENT  form received on 2/12/24  to be completed by PCP. Copy made and placed in PCP folder.    Forms to be delivered to PCP mailbox at assigned time.

## 2024-02-19 NOTE — TELEPHONE ENCOUNTER
FAXED ON 02/19/24 TO [facility] MEDICAL ASSESSMENT FORM at [phone]. FAX CONFIRMATION RECEIVED.    FORMS SCANNED INTO PT CHART.

## 2024-03-27 ENCOUNTER — LAB (OUTPATIENT)
Dept: LAB | Facility: CLINIC | Age: 64
End: 2024-03-27
Payer: MEDICARE

## 2024-03-27 ENCOUNTER — OFFICE VISIT (OUTPATIENT)
Dept: FAMILY MEDICINE CLINIC | Facility: CLINIC | Age: 64
End: 2024-03-27

## 2024-03-27 VITALS
WEIGHT: 148.2 LBS | HEART RATE: 96 BPM | TEMPERATURE: 98.1 F | BODY MASS INDEX: 29.09 KG/M2 | DIASTOLIC BLOOD PRESSURE: 76 MMHG | HEIGHT: 60 IN | SYSTOLIC BLOOD PRESSURE: 114 MMHG | RESPIRATION RATE: 18 BRPM | OXYGEN SATURATION: 96 %

## 2024-03-27 DIAGNOSIS — E05.00 GRAVES DISEASE: ICD-10-CM

## 2024-03-27 DIAGNOSIS — M25.511 CHRONIC RIGHT SHOULDER PAIN: ICD-10-CM

## 2024-03-27 DIAGNOSIS — I10 ESSENTIAL HYPERTENSION: ICD-10-CM

## 2024-03-27 DIAGNOSIS — Z00.00 HEALTHCARE MAINTENANCE: ICD-10-CM

## 2024-03-27 DIAGNOSIS — Z00.00 ANNUAL PHYSICAL EXAM: Primary | ICD-10-CM

## 2024-03-27 DIAGNOSIS — Z23 ENCOUNTER FOR IMMUNIZATION: ICD-10-CM

## 2024-03-27 DIAGNOSIS — G89.29 CHRONIC RIGHT SHOULDER PAIN: ICD-10-CM

## 2024-03-27 LAB
ALBUMIN SERPL BCP-MCNC: 4.5 G/DL (ref 3.5–5)
ALP SERPL-CCNC: 51 U/L (ref 34–104)
ALT SERPL W P-5'-P-CCNC: 17 U/L (ref 7–52)
ANION GAP SERPL CALCULATED.3IONS-SCNC: 8 MMOL/L (ref 4–13)
AST SERPL W P-5'-P-CCNC: 22 U/L (ref 13–39)
BILIRUB SERPL-MCNC: 0.79 MG/DL (ref 0.2–1)
BUN SERPL-MCNC: 11 MG/DL (ref 5–25)
CALCIUM SERPL-MCNC: 9.3 MG/DL (ref 8.4–10.2)
CHLORIDE SERPL-SCNC: 102 MMOL/L (ref 96–108)
CHOLEST SERPL-MCNC: 141 MG/DL
CO2 SERPL-SCNC: 29 MMOL/L (ref 21–32)
CREAT SERPL-MCNC: 0.61 MG/DL (ref 0.6–1.3)
CREAT UR-MCNC: 70.6 MG/DL
GFR SERPL CREATININE-BSD FRML MDRD: 97 ML/MIN/1.73SQ M
GLUCOSE P FAST SERPL-MCNC: 95 MG/DL (ref 65–99)
HDLC SERPL-MCNC: 45 MG/DL
LDLC SERPL CALC-MCNC: 75 MG/DL (ref 0–100)
MICROALBUMIN UR-MCNC: 9.9 MG/L
MICROALBUMIN/CREAT 24H UR: 14 MG/G CREATININE (ref 0–30)
NONHDLC SERPL-MCNC: 96 MG/DL
POTASSIUM SERPL-SCNC: 4.5 MMOL/L (ref 3.5–5.3)
PROT SERPL-MCNC: 7.6 G/DL (ref 6.4–8.4)
SODIUM SERPL-SCNC: 139 MMOL/L (ref 135–147)
T3 SERPL-MCNC: 1.5 NG/ML
T4 FREE SERPL-MCNC: 0.63 NG/DL (ref 0.61–1.12)
TRIGL SERPL-MCNC: 107 MG/DL
TSH SERPL DL<=0.05 MIU/L-ACNC: 0.75 UIU/ML (ref 0.45–4.5)

## 2024-03-27 PROCEDURE — 82043 UR ALBUMIN QUANTITATIVE: CPT

## 2024-03-27 PROCEDURE — 87591 N.GONORRHOEAE DNA AMP PROB: CPT

## 2024-03-27 PROCEDURE — 36415 COLL VENOUS BLD VENIPUNCTURE: CPT

## 2024-03-27 PROCEDURE — 84480 ASSAY TRIIODOTHYRONINE (T3): CPT

## 2024-03-27 PROCEDURE — 84443 ASSAY THYROID STIM HORMONE: CPT

## 2024-03-27 PROCEDURE — 99396 PREV VISIT EST AGE 40-64: CPT | Performed by: FAMILY MEDICINE

## 2024-03-27 PROCEDURE — 90750 HZV VACC RECOMBINANT IM: CPT

## 2024-03-27 PROCEDURE — 87491 CHLMYD TRACH DNA AMP PROBE: CPT

## 2024-03-27 PROCEDURE — 82570 ASSAY OF URINE CREATININE: CPT

## 2024-03-27 PROCEDURE — 80061 LIPID PANEL: CPT

## 2024-03-27 PROCEDURE — 80053 COMPREHEN METABOLIC PANEL: CPT

## 2024-03-27 PROCEDURE — 90471 IMMUNIZATION ADMIN: CPT

## 2024-03-27 PROCEDURE — 84439 ASSAY OF FREE THYROXINE: CPT

## 2024-03-27 NOTE — ASSESSMENT & PLAN NOTE
X-ray right shoulder 6/23: Mild osteoarthritis acromioclavicular joint.  Patient not interested in corticosteroid injection at this point.  Will continue to monitor.     -Motrin 600 mg q6 hrs prn; Tylenol 650mg Q8 PRN; Voltaren gel  -Follows with Physical Therapy

## 2024-03-27 NOTE — PROGRESS NOTES
ADULT ANNUAL PHYSICAL  [facility] [place]    NAME: Ginna Mcmahon  AGE: 63 y.o. SEX: female  : 1960     DATE: 3/27/2024     Assessment and Plan:     Problem List Items Addressed This Visit        Cardiovascular and Mediastinum    Essential hypertension     BP Readings from Last 3 Encounters:   24 114/76   24 107/59   23 128/76     At goal per JNC-8 guidelines (<140/90)    - Continue Lisinopril 5 mg daily.   - CMP, Alb/Creat ratio  -F/U in 6 months         Relevant Orders    Albumin / creatinine urine ratio       Endocrine    Graves disease     Patient follows with [facility] endocrinology.  Methimazole was discontinued by her endocrinologist due to good thyroid control.    - F/U w/ [facility] Endocrinology; 24  - TSH, T4, T3         Relevant Orders    TSH + Free T4    T3       Surgery/Wound/Pain    Right shoulder pain     X-ray right shoulder : Mild osteoarthritis acromioclavicular joint.  Patient not interested in corticosteroid injection at this point.  Will continue to monitor.     -Motrin 600 mg q6 hrs prn; Tylenol 650mg Q8 PRN; Voltaren gel  -Follows with Physical Therapy            Other    Annual physical exam - Primary     Screenings, medications, vaccinations reviewed and updated.    BMI counseling provided.  STD screening with GC/chlamydia  Last Pap smear 3/2023 - negative  PHQ-9 negative  Last mammogram 2024   Zoster vaccine administered today          Other Visit Diagnoses     Encounter for immunization        Relevant Orders    Zoster Vaccine Recombinant IM (Completed)    Healthcare maintenance        Relevant Orders    Comprehensive metabolic panel    Lipid panel    Chlamydia/GC amplified DNA by PCR    Albumin / creatinine urine ratio            Immunizations and preventive care screenings were discussed with patient today. Appropriate education was printed on patient's after visit  summary.    Counseling:  Alcohol/drug use: discussed moderation in alcohol intake, the recommendations for healthy alcohol use, and avoidance of illicit drug use.  Dental Health: discussed importance of regular tooth brushing, flossing, and dental visits.  Injury prevention: discussed safety/seat belts, safety helmets, smoke detectors, carbon dioxide detectors, and smoking near bedding or upholstery.  Sexual health: discussed sexually transmitted diseases, partner selection, use of condoms, avoidance of unintended pregnancy, and contraceptive alternatives.  Exercise: the importance of regular exercise/physical activity was discussed. Recommend exercise 3-5 times per week for at least 30 minutes.     BMI Counseling: Body mass index is 28.94 kg/m². The BMI is above normal. Nutrition recommendations include decreasing portion sizes and moderation in carbohydrate intake. Exercise recommendations include exercising 3-5 times per week and strength training exercises. Rationale for BMI follow-up plan is due to patient being overweight or obese.     Depression Screening and Follow-up Plan: Patient was screened for depression during today's encounter. They screened negative with a PHQ-2 score of 0.        Return in about 6 months (around 9/27/2024) for Next scheduled follow up.     Chief Complaint:     Chief Complaint   Patient presents with   • Annual Exam   • Pain     Pain on right shoulder and down the arm      History of Present Illness:     Adult Annual Physical   Patient here for a comprehensive physical exam. The patient reports problems - Chronic right shoulder pain .    Diet and Physical Activity  Diet/Nutrition: well balanced diet, limited junk food, and consuming 3-5 servings of fruits/vegetables daily.   Exercise: no formal exercise.      Depression Screening  PHQ-2/9 Depression Screening    Little interest or pleasure in doing things: 0 - not at all  Feeling down, depressed, or hopeless: 0 - not at all  PHQ-2  Score: 0  PHQ-2 Interpretation: Negative depression screen       General Health  Sleep: sleeps well and gets 4-6 hours of sleep on average.   Hearing: normal - bilateral.  Vision: goes for regular eye exams, most recent eye exam <1 year ago, and wears glasses.   Dental: regular dental visits, brushes teeth twice daily, and flosses teeth occasionally.       /GYN Health  Follows with gynecology? no   Patient is: postmenopausal  Last menstrual period: 6 years ago  Contraceptive method: menopause.    Advanced Care Planning  Do you have an advanced directive? no  Do you have a durable medical power of ? no  ACP document given to the patient? no     Review of Systems:     Review of Systems   Constitutional:  Negative for chills, fatigue and fever.   HENT:  Negative for congestion, rhinorrhea and sore throat.    Eyes: Negative.    Respiratory:  Negative for cough and shortness of breath.    Cardiovascular:  Negative for chest pain and palpitations.   Gastrointestinal: Negative.    Endocrine: Negative.    Genitourinary: Negative.    Musculoskeletal:  Positive for arthralgias. Negative for gait problem and joint swelling.   Neurological: Negative.       Past Medical History:     Past Medical History:   Diagnosis Date   • Graves disease    • Hypertension       Past Surgical History:     History reviewed. No pertinent surgical history.   Social History:     Social History     Socioeconomic History   • Marital status: /Civil Union     Spouse name: None   • Number of children: None   • Years of education: None   • Highest education level: None   Occupational History   • None   Tobacco Use   • Smoking status: Never     Passive exposure: Never   • Smokeless tobacco: Never   Vaping Use   • Vaping status: Never Used   Substance and Sexual Activity   • Alcohol use: No   • Drug use: No   • Sexual activity: None   Other Topics Concern   • None   Social History Narrative    No caffeine use     Social Determinants of  Health     Financial Resource Strain: Low Risk  (9/5/2023)    Overall Financial Resource Strain (CARDIA)    • Difficulty of Paying Living Expenses: Not very hard   Food Insecurity: No Food Insecurity (9/5/2023)    Hunger Vital Sign    • Worried About Running Out of Food in the Last Year: Never true    • Ran Out of Food in the Last Year: Never true   Transportation Needs: No Transportation Needs (9/5/2023)    PRAPARE - Transportation    • Lack of Transportation (Medical): No    • Lack of Transportation (Non-Medical): No   Physical Activity: Not on file   Stress: Not on file   Social Connections: Not on file   Intimate Partner Violence: Not on file   Housing Stability: Low Risk  (3/27/2024)    Housing Stability Vital Sign    • Unable to Pay for Housing in the Last Year: No    • Number of Places Lived in the Last Year: 1    • Unstable Housing in the Last Year: No      Family History:     Family History   Problem Relation Age of Onset   • No Known Problems Mother    • No Known Problems Father    • No Known Problems Sister    • No Known Problems Daughter    • No Known Problems Maternal Grandmother    • No Known Problems Maternal Grandfather    • No Known Problems Paternal Grandmother    • No Known Problems Paternal Grandfather       Current Medications:     Current Outpatient Medications   Medication Sig Dispense Refill   • acetaminophen (TYLENOL) 650 mg CR tablet Take 1 tablet (650 mg total) by mouth every 8 (eight) hours 90 tablet 2   • ARTIFICIAL TEARS 1.4 % ophthalmic solution      • atorvastatin (LIPITOR) 40 mg tablet Take 1 tablet (40 mg total) by mouth daily 30 tablet 5   • [employer]S ALLERGY RELIEF 10 MG tablet TAKE 1 TABLET BY MOUTH DAILY  3   • Diclofenac Sodium (VOLTAREN) 1 % Apply 2 g topically 4 (four) times a day 150 g 1   • ferrous sulfate 325 (65 Fe) mg tablet Take 325 mg by mouth     • ibuprofen (MOTRIN) 600 mg tablet Take 1 tablet (600 mg total) by mouth every 6 (six) hours as needed for mild pain or moderate  pain 30 tablet 0   • lisinopril (ZESTRIL) 5 mg tablet TAKE 1 TABLET (5 MG TOTAL) BY MOUTH DAILY. 90 tablet 1   • olopatadine (PATANOL) 0.1 % ophthalmic solution Apply 1 drop to eye 2 (two) times a day 5 mL 2   • Blood Pressure Monitoring (BLOOD PRESSURE CUFF) MISC by Does not apply route daily 1 each 0     No current facility-administered medications for this visit.      Allergies:     No Known Allergies   Physical Exam:     /76 (BP Location: Left arm, Patient Position: Sitting, Cuff Size: Standard)   Pulse 96   Temp 98.1 °F (36.7 °C) (Temporal)   Resp 18   Ht 5' (1.524 m)   Wt 67.2 kg (148 lb 3.2 oz)   SpO2 96%   BMI 28.94 kg/m²     Physical Exam  Vitals reviewed.   Constitutional:       General: She is not in acute distress.     Appearance: Normal appearance. She is well-developed.      Comments: Quiet Voice   HENT:      Head: Normocephalic and atraumatic.      Nose: Nose normal. No congestion or rhinorrhea.      Mouth/Throat:      Mouth: Mucous membranes are moist.   Eyes:      General:         Right eye: No discharge.         Left eye: No discharge.      Extraocular Movements: Extraocular movements intact.      Conjunctiva/sclera: Conjunctivae normal.   Cardiovascular:      Rate and Rhythm: Normal rate and regular rhythm.      Heart sounds: No murmur heard.  Pulmonary:      Effort: Pulmonary effort is normal. No respiratory distress.      Breath sounds: Normal breath sounds.   Abdominal:      Palpations: Abdomen is soft.      Tenderness: There is no abdominal tenderness.   Musculoskeletal:         General: No swelling or deformity.      Right shoulder: Tenderness present. No swelling, deformity or effusion. Decreased range of motion. Decreased strength.      Cervical back: Normal range of motion and neck supple. No tenderness.   Lymphadenopathy:      Cervical: No cervical adenopathy.   Skin:     General: Skin is warm and dry.      Capillary Refill: Capillary refill takes less than 2 seconds.    Neurological:      General: No focal deficit present.      Mental Status: She is alert and oriented to person, place, and time.   Psychiatric:         Mood and Affect: Mood normal.          Allan Olmos MD  [facility] [place]

## 2024-03-27 NOTE — ASSESSMENT & PLAN NOTE
Screenings, medications, vaccinations reviewed and updated.    BMI counseling provided.  STD screening with GC/chlamydia  Last Pap smear 3/2023 - negative  PHQ-9 negative  Last mammogram 2/2024   Zoster vaccine administered today

## 2024-03-27 NOTE — ASSESSMENT & PLAN NOTE
BP Readings from Last 3 Encounters:   03/27/24 114/76   01/04/24 107/59   12/06/23 128/76     At goal per JNC-8 guidelines (<140/90)    - Continue Lisinopril 5 mg daily.   - CMP, Alb/Creat ratio  -F/U in 6 months

## 2024-03-27 NOTE — ASSESSMENT & PLAN NOTE
Patient follows with [facility] endocrinology.  Methimazole was discontinued by her endocrinologist due to good thyroid control.    - F/U w/ [facility] Endocrinology; 4/17/24  - TSH, T4, T3

## 2024-03-28 LAB
C TRACH DNA SPEC QL NAA+PROBE: NEGATIVE
N GONORRHOEA DNA SPEC QL NAA+PROBE: NEGATIVE

## 2024-06-12 DIAGNOSIS — M25.511 ACUTE PAIN OF RIGHT SHOULDER: ICD-10-CM

## 2024-06-12 RX ORDER — SENNOSIDES 8.6 MG
650 CAPSULE ORAL EVERY 8 HOURS SCHEDULED
Qty: 90 TABLET | Refills: 2 | Status: SHIPPED | OUTPATIENT
Start: 2024-06-12

## 2024-09-08 DIAGNOSIS — M25.511 ACUTE PAIN OF RIGHT SHOULDER: ICD-10-CM

## 2024-09-09 RX ORDER — SENNOSIDES 8.6 MG
650 CAPSULE ORAL EVERY 8 HOURS SCHEDULED
Qty: 90 TABLET | Refills: 2 | Status: SHIPPED | OUTPATIENT
Start: 2024-09-09

## 2024-12-09 ENCOUNTER — VBI (OUTPATIENT)
Dept: ADMINISTRATIVE | Facility: OTHER | Age: 64
End: 2024-12-09

## 2024-12-09 NOTE — TELEPHONE ENCOUNTER
12/09/24 7:42 AM     Chart reviewed for Blood Pressure was/were submitted to the patient's insurance.     Hari Lara MA   [facility]

## 2025-01-19 DIAGNOSIS — M25.511 ACUTE PAIN OF RIGHT SHOULDER: ICD-10-CM

## 2025-01-20 RX ORDER — SENNOSIDES 8.6 MG
650 CAPSULE ORAL EVERY 8 HOURS SCHEDULED
Qty: 90 TABLET | Refills: 2 | Status: SHIPPED | OUTPATIENT
Start: 2025-01-20

## 2025-03-04 ENCOUNTER — TELEPHONE (OUTPATIENT)
Dept: FAMILY MEDICINE CLINIC | Facility: CLINIC | Age: 65
End: 2025-03-04

## 2025-03-04 DIAGNOSIS — N63.10 MASS OF RIGHT BREAST, UNSPECIFIED QUADRANT: Primary | ICD-10-CM

## 2025-03-04 DIAGNOSIS — Z12.31 ENCOUNTER FOR SCREENING MAMMOGRAM FOR BREAST CANCER: ICD-10-CM

## 2025-03-04 NOTE — TELEPHONE ENCOUNTER
Patient came into office today again requesting order for ultrasound , right dr goode. Pt requesting order to be fax to [facility] at [phone].  Pt would like to be notify when referral placed and fax. Please advise. Thank you!

## 2025-03-04 NOTE — TELEPHONE ENCOUNTER
Patient should have requested for an ultrasound of her right breast and NOT a mammogram when she came in this morning. Please inform her that the mammogram completed at [facility] on 2/12/25, showed a right beast mass. Order for ultrasound right breast can be found in EPIC and faxed to patient's requested facility ([facility] at [phone]).  - Patient will be due for an annual physical on 3/27. Please schedule. Thanks

## 2025-03-05 NOTE — TELEPHONE ENCOUNTER
FAXED ON 03/05/25 TO [facility]  at [phone]. FAX CONFIRMATION RECEIVED.  Scanned into pt chart.

## 2025-04-06 PROBLEM — Z00.00 ANNUAL PHYSICAL EXAM: Status: RESOLVED | Noted: 2024-03-27 | Resolved: 2025-04-06

## 2025-04-06 NOTE — PROGRESS NOTES
Adult Annual Physical  Name: Ginna Mcmahon      : 1960      MRN: [id number]  Encounter Provider: Allan Olmos MD  Encounter Date: 2025   Encounter department: [facility] [place]    :  Assessment & Plan  Annual physical exam  See screening/vaccination  information below    Orders:  •  Comprehensive metabolic panel; Future  •  Hemoglobin A1C; Future  •  Lipid Panel with Direct LDL reflex; Future  •  CBC and differential; Future    Encounter for immunization    Orders:  •  Zoster Vaccine Recombinant IM    Mass of upper outer quadrant of right breast  3/11/25 - U/S right breast - Probably benign mass, likely a cyst, in the right breast at the 12 o'clock position 3 cm from the nipple.  Follow-up ultrasound in 6 months is recommended to ensure stability.     PLAN:   Repeat U/S ordered and order printed for patient.     Orders:  •  US breast right limited (diagnostic); Future    Graves disease  Stable.    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  No risk factors such as smoking or alcohol drinking.   Sexually active with  only.     PLAN  - GlyOxide mouthwash to help with symptoms   - Will check for HIV    Orders:  •  HIV 1/2 AG/AB w Reflex [facility] for 2 yr old and above; Future  •  carbamide peroxide (GLY-OXIDE) 10 %; Apply 1 Application to the mouth or throat 4 (four) times a day    Growth of eyelid  Likely to be cystic vs mucus filled lesion in upper eyelid.    - Will refer to dermatology for possible resection as patient is bothered by lesion     Orders:  •  Ambulatory Referral to Dermatology; Future    Bilateral impacted cerumen  Bilateral cerumen impaction on exam    - Apply debrox to both ears  - Consider ear irrigation if no improvement      Orders:  •  carbamide peroxide (DEBROX) 6.5 % otic solution; Administer 5 drops into both ears 2  (two) times a day            Preventive Screenings:  - Diabetes Screening: orders placed  - Cholesterol Screening: screening not indicated and has hyperlipidemia   - Hepatitis C screening: screening up-to-date   - HIV screening: screening up-to-date   - Cervical cancer screening: screening up-to-date   - Breast cancer screening: screening up-to-date   - Colon cancer screening: screening up-to-date   - Lung cancer screening: screening not indicated     Counseling/Anticipatory Guidance:    - Diet: discussed recommendations for a healthy/well-balanced diet.   - Exercise: the importance of regular exercise/physical activity was discussed. Recommend exercise 3-5 times per week for at least 30 minutes.     BMI Counseling: Body mass index is 27.95 kg/m². The BMI is above normal. Nutrition recommendations include decreasing portion sizes and moderation in carbohydrate intake. Exercise recommendations include moderate physical activity 150 minutes/week and exercising 3-5 times per week. No pharmacotherapy was ordered. Rationale for BMI follow-up plan is due to patient being overweight or obese.     Depression Screening and Follow-up Plan: Patient was screened for depression during today's encounter. They screened negative with a PHQ-2 score of 0.          History of Present Illness   {?Quick Links Encounters * My Last Note * Last Note in Specialty * Snapshot * Since Last Visit * History :55180}  Adult Annual Physical:  Patient presents for annual physical. Ginna Mcmahon is a 64 y.o. female with a PMH of HTN, HLD, Graves Dz for an annual physical. Patient has no complaints today. On physical exam. It was noted that she had a white lesion on her tongue which she states has been present for some years. Furthermore there was a growth noted on her left upper eye lid that she states has been there for years and slowly growing. Her vision is not affected. .     Diet and Physical Activity:  - Diet/Nutrition: consuming 3-5 servings  of fruits/vegetables daily, limited junk food and well balanced diet.  - Exercise: no formal exercise.    Depression Screening:  - PHQ-2 Score: 0    General Health:  - Sleep: sleeps well and 4-6 hours of sleep on average.  - Hearing: normal hearing bilateral ears.  - Vision: most recent eye exam < 1 year ago and wears glasses.  - Dental: regular dental visits, brushes teeth twice daily and floss regularly.    /GYN Health:  - Follows with GYN: no.   - Menopause: postmenopausal.   - History of STDs: no    Review of Systems   Constitutional:  Negative for chills, fatigue and fever.   HENT:  Negative for congestion, dental problem, drooling and sore throat.    Eyes:  Negative for photophobia, pain, discharge, redness, itching and visual disturbance.   Respiratory:  Negative for cough, shortness of breath and wheezing.    Cardiovascular:  Negative for chest pain and palpitations.   Gastrointestinal:  Negative for abdominal pain, constipation, diarrhea, nausea and vomiting.   Endocrine: Negative for cold intolerance and heat intolerance.   Genitourinary:  Negative for difficulty urinating, dysuria and hematuria.   Musculoskeletal:  Negative for arthralgias, back pain and joint swelling.   Skin:  Negative for color change and rash.   Neurological:  Negative for dizziness, syncope and headaches.   All other systems reviewed and are negative.    Pertinent Medical History           Medical History Reviewed by provider this encounter:  Tobacco  Allergies  Meds  Problems  Med Hx  Surg Hx  Fam Hx     .  Past Medical History   Past Medical History:   Diagnosis Date   • Graves disease    • Hypertension    • Mass of right breast 03/04/2025    Mammogram 2/12/2025 - There is a mass seen in the superior region of the right breast located 5 centimeters from the nipple.        History reviewed. No pertinent surgical history.  Family History   Problem Relation Age of Onset   • No Known Problems Mother    • No Known Problems  Father    • No Known Problems Sister    • No Known Problems Daughter    • No Known Problems Maternal Grandmother    • No Known Problems Maternal Grandfather    • No Known Problems Paternal Grandmother    • No Known Problems Paternal Grandfather       reports that she has never smoked. She has never been exposed to tobacco smoke. She has never used smokeless tobacco. She reports that she does not drink alcohol and does not use drugs.  Current Outpatient Medications   Medication Instructions   • acetaminophen (TYLENOL) 650 mg, Oral, Every 8 hours scheduled   • ARTIFICIAL TEARS 1.4 % ophthalmic solution No dose, route, or frequency recorded.   • atorvastatin (LIPITOR) 40 mg, Oral, Daily   • Blood Pressure Monitoring (BLOOD PRESSURE CUFF) MISC Does not apply, Daily   • carbamide peroxide (DEBROX) 6.5 % otic solution 5 drops, Both Ears, 2 times daily   • carbamide peroxide (GLY-OXIDE) 10 % 1 Application, Mouth/Throat, 4 times daily   • CVS ALLERGY RELIEF 10 MG tablet TAKE 1 TABLET BY MOUTH DAILY   • Diclofenac Sodium (VOLTAREN) 2 g, Topical, 4 times daily   • ferrous sulfate 325 mg   • lisinopril (ZESTRIL) 5 mg, Oral, Daily   • olopatadine (PATANOL) 0.1 % ophthalmic solution 1 drop, Ophthalmic, 2 times daily   No Known Allergies   Current Outpatient Medications on File Prior to Visit   Medication Sig Dispense Refill   • acetaminophen (TYLENOL) 650 mg CR tablet TAKE 1 TABLET (650 MG TOTAL) BY MOUTH EVERY 8 (EIGHT) HOURS 90 tablet 2   • ARTIFICIAL TEARS 1.4 % ophthalmic solution      • atorvastatin (LIPITOR) 40 mg tablet Take 1 tablet (40 mg total) by mouth daily 30 tablet 5   • Blood Pressure Monitoring (BLOOD PRESSURE CUFF) MISC by Does not apply route daily 1 each 0   • CVS ALLERGY RELIEF 10 MG tablet TAKE 1 TABLET BY MOUTH DAILY  3   • Diclofenac Sodium (VOLTAREN) 1 % Apply 2 g topically 4 (four) times a day 150 g 1   • ferrous sulfate 325 (65 Fe) mg tablet Take 325 mg by mouth     • lisinopril (ZESTRIL) 5 mg tablet  "TAKE 1 TABLET (5 MG TOTAL) BY MOUTH DAILY. 90 tablet 1   • olopatadine (PATANOL) 0.1 % ophthalmic solution Apply 1 drop to eye 2 (two) times a day 5 mL 2     No current facility-administered medications on file prior to visit.      Social History     Tobacco Use   • Smoking status: Never     Passive exposure: Never   • Smokeless tobacco: Never   Vaping Use   • Vaping status: Never Used   Substance and Sexual Activity   • Alcohol use: No   • Drug use: No   • Sexual activity: Not on file       Objective {?Quick Links Trend Vitals * Enter New Vitals * Results Review * Timeline (Adult) * Labs * Imaging * Cardiology * Procedures * Lung Cancer Screening * Surgical eConsent :19860}  /84 (BP Location: Right arm, Patient Position: Sitting, Cuff Size: Standard)   Pulse 91   Temp 97.6 °F (36.4 °C) (Temporal)   Resp 16   Ht 5' 2\" (1.575 m)   Wt 69.3 kg (152 lb 12.8 oz)   SpO2 98%   BMI 27.95 kg/m²     Physical Exam  Vitals reviewed.   Constitutional:       General: She is not in acute distress.     Appearance: Normal appearance. She is well-developed. She is not ill-appearing.      Comments: Quiet Voice   HENT:      Head: Normocephalic and atraumatic.      Right Ear: External ear normal. There is impacted cerumen.      Left Ear: External ear normal. There is impacted cerumen.      Nose: Nose normal. No congestion or rhinorrhea.      Mouth/Throat:      Mouth: Mucous membranes are moist.      Tongue: Lesions present.      Pharynx: No pharyngeal swelling, oropharyngeal exudate or posterior oropharyngeal erythema.      Tonsils: No tonsillar exudate or tonsillar abscesses.      Comments: White patch on tongue that is non scrapable.  Eyes:      General: Vision grossly intact.         Right eye: No discharge.         Left eye: No discharge.      Extraocular Movements: Extraocular movements intact.      Conjunctiva/sclera: Conjunctivae normal.      Pupils: Pupils are equal, round, and reactive to light.      Comments: " ~0.5cm cystic growth on left upper eye lid (see pics)   Cardiovascular:      Rate and Rhythm: Normal rate and regular rhythm.      Heart sounds: Normal heart sounds. No murmur heard.  Pulmonary:      Effort: Pulmonary effort is normal. No respiratory distress.      Breath sounds: Normal breath sounds.   Abdominal:      Palpations: Abdomen is soft.      Tenderness: There is no abdominal tenderness.   Musculoskeletal:         General: No swelling or deformity. Normal range of motion.      Cervical back: Normal range of motion and neck supple. No tenderness.   Lymphadenopathy:      Cervical: No cervical adenopathy.   Skin:     General: Skin is warm and dry.      Capillary Refill: Capillary refill takes less than 2 seconds.   Neurological:      General: No focal deficit present.      Mental Status: She is alert and oriented to person, place, and time.   Psychiatric:         Mood and Affect: Mood normal.

## 2025-04-06 NOTE — ASSESSMENT & PLAN NOTE
3/11/25 - U/S right breast - Probably benign mass, likely a cyst, in the right breast at the 12 o'clock position 3 cm from the nipple.  Follow-up ultrasound in 6 months is recommended to ensure stability.     PLAN:   Repeat U/S ordered and order printed for patient.     Orders:  •  US breast right limited (diagnostic); Future

## 2025-04-06 NOTE — ASSESSMENT & PLAN NOTE
See screening/vaccination  information below    Orders:  •  Comprehensive metabolic panel; Future  •  Hemoglobin A1C; Future  •  Lipid Panel with Direct LDL reflex; Future  •  CBC and differential; Future

## 2025-04-07 ENCOUNTER — OFFICE VISIT (OUTPATIENT)
Dept: FAMILY MEDICINE CLINIC | Facility: CLINIC | Age: 65
End: 2025-04-07

## 2025-04-07 VITALS
TEMPERATURE: 97.6 F | OXYGEN SATURATION: 98 % | HEART RATE: 91 BPM | WEIGHT: 152.8 LBS | SYSTOLIC BLOOD PRESSURE: 132 MMHG | DIASTOLIC BLOOD PRESSURE: 84 MMHG | BODY MASS INDEX: 28.12 KG/M2 | RESPIRATION RATE: 16 BRPM | HEIGHT: 62 IN

## 2025-04-07 DIAGNOSIS — H61.23 BILATERAL IMPACTED CERUMEN: ICD-10-CM

## 2025-04-07 DIAGNOSIS — Z00.00 ANNUAL PHYSICAL EXAM: Primary | ICD-10-CM

## 2025-04-07 DIAGNOSIS — K13.21 LEUKOPLAKIA OF TONGUE: ICD-10-CM

## 2025-04-07 DIAGNOSIS — E05.00 GRAVES DISEASE: ICD-10-CM

## 2025-04-07 DIAGNOSIS — N63.11 MASS OF UPPER OUTER QUADRANT OF RIGHT BREAST: ICD-10-CM

## 2025-04-07 DIAGNOSIS — D49.2 GROWTH OF EYELID: ICD-10-CM

## 2025-04-07 DIAGNOSIS — Z23 ENCOUNTER FOR IMMUNIZATION: ICD-10-CM

## 2025-04-07 PROCEDURE — 99214 OFFICE O/P EST MOD 30 MIN: CPT | Performed by: FAMILY MEDICINE

## 2025-04-07 PROCEDURE — 90750 HZV VACC RECOMBINANT IM: CPT

## 2025-04-07 PROCEDURE — 90471 IMMUNIZATION ADMIN: CPT

## 2025-04-07 PROCEDURE — 99396 PREV VISIT EST AGE 40-64: CPT | Performed by: FAMILY MEDICINE

## 2025-04-07 RX ORDER — NYSTATIN 100000 [USP'U]/ML
500000 SUSPENSION ORAL 4 TIMES DAILY
Status: CANCELLED | OUTPATIENT
Start: 2025-04-07

## 2025-04-07 NOTE — ASSESSMENT & PLAN NOTE
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  No risk factors such as smoking or alcohol drinking.   Sexually active with  only.     PLAN  - GlyOxide mouthwash to help with symptoms   - Will check for HIV    Orders:  •  HIV 1/2 AG/AB w Reflex [facility] for 2 yr old and above; Future  •  carbamide peroxide (GLY-OXIDE) 10 %; Apply 1 Application to the mouth or throat 4 (four) times a day

## 2025-04-07 NOTE — ASSESSMENT & PLAN NOTE
Bilateral cerumen impaction on exam    - Apply debrox to both ears  - Consider ear irrigation if no improvement      Orders:  •  carbamide peroxide (DEBROX) 6.5 % otic solution; Administer 5 drops into both ears 2 (two) times a day

## 2025-04-07 NOTE — ASSESSMENT & PLAN NOTE
Likely to be cystic vs mucus filled lesion in upper eyelid.    - Will refer to dermatology for possible resection as patient is bothered by lesion     Orders:  •  Ambulatory Referral to Dermatology; Future

## 2025-04-08 ENCOUNTER — APPOINTMENT (OUTPATIENT)
Dept: LAB | Facility: HOSPITAL | Age: 65
End: 2025-04-08

## 2025-04-08 ENCOUNTER — TELEPHONE (OUTPATIENT)
Dept: FAMILY MEDICINE CLINIC | Facility: CLINIC | Age: 65
End: 2025-04-08

## 2025-04-08 DIAGNOSIS — Z00.00 ANNUAL PHYSICAL EXAM: ICD-10-CM

## 2025-04-08 DIAGNOSIS — K13.21 LEUKOPLAKIA OF TONGUE: ICD-10-CM

## 2025-04-08 DIAGNOSIS — E05.00 GRAVES DISEASE: ICD-10-CM

## 2025-04-08 LAB
ALBUMIN SERPL BCG-MCNC: 4.6 G/DL (ref 3.5–5)
ALP SERPL-CCNC: 56 U/L (ref 34–104)
ALT SERPL W P-5'-P-CCNC: 14 U/L (ref 7–52)
ANION GAP SERPL CALCULATED.3IONS-SCNC: 8 MMOL/L (ref 4–13)
AST SERPL W P-5'-P-CCNC: 19 U/L (ref 13–39)
BASOPHILS # BLD AUTO: 0.05 THOUSANDS/ÂΜL (ref 0–0.1)
BASOPHILS NFR BLD AUTO: 1 % (ref 0–1)
BILIRUB SERPL-MCNC: 1.27 MG/DL (ref 0.2–1)
BUN SERPL-MCNC: 10 MG/DL (ref 5–25)
CALCIUM SERPL-MCNC: 10 MG/DL (ref 8.4–10.2)
CHLORIDE SERPL-SCNC: 103 MMOL/L (ref 96–108)
CHOLEST SERPL-MCNC: 143 MG/DL (ref ?–200)
CO2 SERPL-SCNC: 31 MMOL/L (ref 21–32)
CREAT SERPL-MCNC: 0.69 MG/DL (ref 0.6–1.3)
EOSINOPHIL # BLD AUTO: 0.09 THOUSAND/ÂΜL (ref 0–0.61)
EOSINOPHIL NFR BLD AUTO: 1 % (ref 0–6)
ERYTHROCYTE [DISTWIDTH] IN BLOOD BY AUTOMATED COUNT: 13.8 % (ref 11.6–15.1)
GFR SERPL CREATININE-BSD FRML MDRD: 92 ML/MIN/1.73SQ M
GLUCOSE P FAST SERPL-MCNC: 82 MG/DL (ref 65–99)
HCT VFR BLD AUTO: 44.6 % (ref 34.8–46.1)
HDLC SERPL-MCNC: 44 MG/DL
HGB BLD-MCNC: 14.4 G/DL (ref 11.5–15.4)
IMM GRANULOCYTES # BLD AUTO: 0.02 THOUSAND/UL (ref 0–0.2)
IMM GRANULOCYTES NFR BLD AUTO: 0 % (ref 0–2)
LDLC SERPL CALC-MCNC: 83 MG/DL (ref 0–100)
LYMPHOCYTES # BLD AUTO: 1.46 THOUSANDS/ÂΜL (ref 0.6–4.47)
LYMPHOCYTES NFR BLD AUTO: 22 % (ref 14–44)
MCH RBC QN AUTO: 26.9 PG (ref 26.8–34.3)
MCHC RBC AUTO-ENTMCNC: 32.3 G/DL (ref 31.4–37.4)
MCV RBC AUTO: 83 FL (ref 82–98)
MONOCYTES # BLD AUTO: 0.49 THOUSAND/ÂΜL (ref 0.17–1.22)
MONOCYTES NFR BLD AUTO: 8 % (ref 4–12)
NEUTROPHILS # BLD AUTO: 4.45 THOUSANDS/ÂΜL (ref 1.85–7.62)
NEUTS SEG NFR BLD AUTO: 68 % (ref 43–75)
NRBC BLD AUTO-RTO: 0 /100 WBCS
PLATELET # BLD AUTO: 286 THOUSANDS/UL (ref 149–390)
PMV BLD AUTO: 11.4 FL (ref 8.9–12.7)
POTASSIUM SERPL-SCNC: 4.6 MMOL/L (ref 3.5–5.3)
PROT SERPL-MCNC: 8.4 G/DL (ref 6.4–8.4)
RBC # BLD AUTO: 5.35 MILLION/UL (ref 3.81–5.12)
SODIUM SERPL-SCNC: 142 MMOL/L (ref 135–147)
TRIGL SERPL-MCNC: 78 MG/DL (ref ?–150)
TSH SERPL DL<=0.05 MIU/L-ACNC: 0.6 UIU/ML (ref 0.45–4.5)
WBC # BLD AUTO: 6.56 THOUSAND/UL (ref 4.31–10.16)

## 2025-04-08 PROCEDURE — 87389 HIV-1 AG W/HIV-1&-2 AB AG IA: CPT

## 2025-04-08 PROCEDURE — 85025 COMPLETE CBC W/AUTO DIFF WBC: CPT

## 2025-04-08 PROCEDURE — 83036 HEMOGLOBIN GLYCOSYLATED A1C: CPT

## 2025-04-08 PROCEDURE — 84439 ASSAY OF FREE THYROXINE: CPT

## 2025-04-08 PROCEDURE — 80061 LIPID PANEL: CPT

## 2025-04-08 PROCEDURE — 36415 COLL VENOUS BLD VENIPUNCTURE: CPT

## 2025-04-08 PROCEDURE — 84443 ASSAY THYROID STIM HORMONE: CPT

## 2025-04-08 PROCEDURE — 80053 COMPREHEN METABOLIC PANEL: CPT

## 2025-04-09 ENCOUNTER — RESULTS FOLLOW-UP (OUTPATIENT)
Dept: FAMILY MEDICINE CLINIC | Facility: CLINIC | Age: 65
End: 2025-04-09

## 2025-04-09 DIAGNOSIS — R73.03 PREDIABETES: Primary | ICD-10-CM

## 2025-04-09 LAB
EST. AVERAGE GLUCOSE BLD GHB EST-MCNC: 131 MG/DL
HBA1C MFR BLD: 6.2 %
HIV 1+2 AB+HIV1 P24 AG SERPL QL IA: NORMAL
T4 FREE SERPL-MCNC: 0.6 NG/DL (ref 0.61–1.12)

## 2025-04-20 DIAGNOSIS — M25.511 ACUTE PAIN OF RIGHT SHOULDER: ICD-10-CM

## 2025-04-21 RX ORDER — SENNOSIDES 8.6 MG
650 CAPSULE ORAL EVERY 8 HOURS SCHEDULED
Qty: 90 TABLET | Refills: 2 | Status: SHIPPED | OUTPATIENT
Start: 2025-04-21

## 2025-05-05 ENCOUNTER — OFFICE VISIT (OUTPATIENT)
Dept: FAMILY MEDICINE CLINIC | Facility: CLINIC | Age: 65
End: 2025-05-05

## 2025-05-05 VITALS
DIASTOLIC BLOOD PRESSURE: 70 MMHG | SYSTOLIC BLOOD PRESSURE: 120 MMHG | OXYGEN SATURATION: 96 % | HEART RATE: 95 BPM | TEMPERATURE: 97.8 F | HEIGHT: 62 IN | RESPIRATION RATE: 16 BRPM | WEIGHT: 149 LBS | BODY MASS INDEX: 27.42 KG/M2

## 2025-05-05 DIAGNOSIS — K14.1 GEOGRAPHIC TONGUE: Primary | ICD-10-CM

## 2025-05-05 PROBLEM — I83.813 VARICOSE VEINS OF BOTH LOWER EXTREMITIES WITH PAIN: Status: RESOLVED | Noted: 2020-09-18 | Resolved: 2025-05-05

## 2025-05-05 PROCEDURE — 99213 OFFICE O/P EST LOW 20 MIN: CPT | Performed by: FAMILY MEDICINE

## 2025-05-05 NOTE — PROGRESS NOTES
Name: Ginna Mcmahon      : 1960      MRN: [id number]  Encounter Provider: Allan Olmos MD  Encounter Date: 2025   Encounter department: [facility]  :  Assessment & Plan  Geographic tongue  Geographic tongue continues to be noticed on exam.  HIV negative.  No other risk factors to suspect that this is a cancerous lesion.    PLAN  - Continue with carbamide peroxide oral solution 4 times a day  -Patient encouraged to use a tongue scraper when brushing              History of Present Illness {?Quick Links Encounters * My Last Note * Last Note in Specialty * Snapshot * Since Last Visit * History :51524}  Ginna Mcmahon is a 62 y.o. female with a pmh of htn, graves presenting for follow-up of her chronic conditions.  Patient has no acute complaints today.  At the previous visit, patient was noted to have geographic tongue and was further reassured today that this is a benign finding.  Other symptoms endorsed or denied per ROS.             Review of Systems   Constitutional:  Negative for chills, fatigue and fever.   HENT:  Negative for congestion, dental problem, drooling and sore throat.    Respiratory:  Negative for cough, shortness of breath and wheezing.    Cardiovascular:  Negative for chest pain and palpitations.   Gastrointestinal:  Negative for abdominal pain, constipation, diarrhea, nausea and vomiting.   Genitourinary:  Negative for difficulty urinating.   Musculoskeletal:  Negative for arthralgias.   Skin:  Negative for rash.   Neurological:  Negative for dizziness and headaches.   All other systems reviewed and are negative.      Objective {?Quick Links Trend Vitals * Enter New Vitals * Results Review * Timeline (Adult) * Labs * Imaging * Cardiology * Procedures * Lung Cancer Screening * Surgical eConsent :35834}  /70 (BP Location: Left arm, Patient Position: Sitting, Cuff Size: Standard)   Pulse 95   Temp 97.8 °F (36.6 °C) (Temporal)    "Resp 16   Ht 5' 2\" (1.575 m)   Wt 67.6 kg (149 lb)   SpO2 96%   BMI 27.25 kg/m²      Physical Exam  Vitals reviewed.   Constitutional:       General: She is not in acute distress.     Appearance: Normal appearance. She is well-developed. She is not ill-appearing.      Comments: Quiet Voice   HENT:      Head: Normocephalic and atraumatic.      Nose: Nose normal. No congestion or rhinorrhea.      Mouth/Throat:      Mouth: Mucous membranes are moist.      Tongue: Lesions present.      Pharynx: No pharyngeal swelling, oropharyngeal exudate or posterior oropharyngeal erythema.      Tonsils: No tonsillar exudate or tonsillar abscesses.      Comments: White patches on tongue. non scrapable.  Eyes:      General: Vision grossly intact.      Extraocular Movements: Extraocular movements intact.      Conjunctiva/sclera: Conjunctivae normal.   Cardiovascular:      Rate and Rhythm: Normal rate and regular rhythm.      Heart sounds: Normal heart sounds. No murmur heard.  Pulmonary:      Effort: Pulmonary effort is normal. No respiratory distress.      Breath sounds: Normal breath sounds.   Abdominal:      Palpations: Abdomen is soft.      Tenderness: There is no abdominal tenderness.   Musculoskeletal:         General: No swelling or deformity. Normal range of motion.      Cervical back: Normal range of motion and neck supple. No tenderness.   Lymphadenopathy:      Cervical: No cervical adenopathy.   Skin:     General: Skin is warm and dry.      Capillary Refill: Capillary refill takes less than 2 seconds.   Neurological:      General: No focal deficit present.      Mental Status: She is alert and oriented to person, place, and time.   Psychiatric:         Mood and Affect: Mood normal.           "

## 2025-05-05 NOTE — ASSESSMENT & PLAN NOTE
Geographic tongue continues to be noticed on exam.  HIV negative.  No other risk factors to suspect that this is a cancerous lesion.    PLAN  - Continue with carbamide peroxide oral solution 4 times a day  -Patient encouraged to use a tongue scraper when brushing

## 2025-05-07 PROBLEM — H61.23 BILATERAL IMPACTED CERUMEN: Status: RESOLVED | Noted: 2025-04-07 | Resolved: 2025-05-07

## 2025-05-08 ENCOUNTER — TELEPHONE (OUTPATIENT)
Dept: FAMILY MEDICINE CLINIC | Facility: CLINIC | Age: 65
End: 2025-05-08

## 2025-05-08 NOTE — LETTER
May 13, 2025     Patient: Ginna Mcmahon  YOB: 1960  Date of Visit: 5/8/2025      To Whom it May Concern:    Ginna Mcmahon is under my professional care. Please see the list of Ginna's medical conditions below:      Patient Active Problem List   Diagnosis   • Graves disease   • Essential hypertension   • Hyperlipidemia   • Right shoulder pain   • Mass of right breast   • Growth of eyelid   • Geographic tongue   • Prediabetes        If you have any questions or concerns, please don't hesitate to call.         Sincerely,          Allan Olmos MD        CC: No Recipients

## 2025-05-08 NOTE — TELEPHONE ENCOUNTER
Patient came into office requesting letter stating medical conditions for insurance. Please advise. Thank you!